# Patient Record
Sex: MALE | Race: BLACK OR AFRICAN AMERICAN | NOT HISPANIC OR LATINO | Employment: FULL TIME | URBAN - METROPOLITAN AREA
[De-identification: names, ages, dates, MRNs, and addresses within clinical notes are randomized per-mention and may not be internally consistent; named-entity substitution may affect disease eponyms.]

---

## 2017-03-21 ENCOUNTER — HOSPITAL ENCOUNTER (EMERGENCY)
Facility: HOSPITAL | Age: 42
Discharge: HOME | End: 2017-03-21
Attending: EMERGENCY MEDICINE

## 2017-03-21 VITALS
TEMPERATURE: 98 F | SYSTOLIC BLOOD PRESSURE: 129 MMHG | RESPIRATION RATE: 20 BRPM | HEIGHT: 66 IN | DIASTOLIC BLOOD PRESSURE: 71 MMHG | HEART RATE: 56 BPM | WEIGHT: 160 LBS | BODY MASS INDEX: 25.71 KG/M2 | OXYGEN SATURATION: 97 %

## 2017-03-21 DIAGNOSIS — M79.18 MUSCULOSKELETAL PAIN: Primary | ICD-10-CM

## 2017-03-21 LAB
BACTERIA #/AREA URNS HPF: NORMAL /HPF
BILIRUB UR QL STRIP: NEGATIVE
CLARITY UR: CLEAR
COLOR UR: YELLOW
GLUCOSE UR QL STRIP: NEGATIVE
HGB UR QL STRIP: NEGATIVE
KETONES UR QL STRIP: NEGATIVE
LEUKOCYTE ESTERASE UR QL STRIP: NEGATIVE
MICROSCOPIC COMMENT: NORMAL
NITRITE UR QL STRIP: NEGATIVE
PH UR STRIP: 5 [PH] (ref 5–8)
PROT UR QL STRIP: NEGATIVE
RBC #/AREA URNS HPF: 2 /HPF (ref 0–4)
SP GR UR STRIP: 1.02 (ref 1–1.03)
SQUAMOUS #/AREA URNS HPF: 2 /HPF
URN SPEC COLLECT METH UR: NORMAL
UROBILINOGEN UR STRIP-ACNC: NEGATIVE EU/DL
WBC #/AREA URNS HPF: 1 /HPF (ref 0–5)

## 2017-03-21 PROCEDURE — 81000 URINALYSIS NONAUTO W/SCOPE: CPT

## 2017-03-21 PROCEDURE — 99283 EMERGENCY DEPT VISIT LOW MDM: CPT

## 2017-03-21 RX ORDER — METHOCARBAMOL 750 MG/1
1500 TABLET, FILM COATED ORAL 3 TIMES DAILY
Qty: 30 TABLET | Refills: 0 | Status: SHIPPED | OUTPATIENT
Start: 2017-03-21 | End: 2017-03-26

## 2017-03-21 NOTE — ED PROVIDER NOTES
"Encounter Date: 3/21/2017    SCRIBE #1 NOTE: I, Shu Oconnell, am scribing for, and in the presence of,  ADRIANA Mckinney. I have scribed the following portions of the note - Other sections scribed: HPI, ROS.       History     Chief Complaint   Patient presents with    Back Pain     Pt. c/o frequent urination and back pain that began in the lower back and "now is higher up".      Review of patient's allergies indicates:  No Known Allergies  HPI Comments: CC: Back Pain    HPI: 41 year old male with no pertinent PMHx presents to the ED c/o intermittent, moderate (4/10) bilateral lower back pain for 3 weeks. Patient states the pain worsened 3 days ago, and is now radiating up to his left flank. Patient reports frequency 3 days ago which he attributed to drinking a lot at work. Pain is exacerbated with movement. Patient reports treating with Aleve, and seeing a chiropractor which provided temporary relief. Patient denies a hx of kidney infections. Patient otherwise denies fall, trauma, penile discharge, dysuria, testicular pain, nausea, vomiting, appetite loss and other symptoms.       The history is provided by the patient. No  was used.     Past Medical History:   Diagnosis Date    ACL (anterior cruciate ligament) rupture      Past Surgical History:   Procedure Laterality Date    ANTERIOR CRUCIATE LIGAMENT REPAIR       History reviewed. No pertinent family history.  Social History   Substance Use Topics    Smoking status: Never Smoker    Smokeless tobacco: None    Alcohol use Yes     Review of Systems   Constitutional: Negative for appetite change, chills and fever.   HENT: Negative for ear pain, rhinorrhea and sore throat.    Eyes: Negative for pain.   Respiratory: Negative for cough and shortness of breath.    Cardiovascular: Negative for chest pain.   Gastrointestinal: Negative for abdominal pain, diarrhea, nausea and vomiting.   Genitourinary: Positive for flank pain (left sided) " and frequency. Negative for decreased urine volume, discharge, dysuria, hematuria, testicular pain and urgency.   Musculoskeletal: Positive for back pain (bilateral lower).   Skin: Negative for rash.   Neurological: Negative for headaches.       Physical Exam   Initial Vitals   BP Pulse Resp Temp SpO2   03/21/17 1203 03/21/17 1203 03/21/17 1203 03/21/17 1203 03/21/17 1203   119/64 73 17 97.6 °F (36.4 °C) 97 %     Physical Exam    Nursing note and vitals reviewed.  Constitutional: He appears well-developed and well-nourished. He is not diaphoretic. No distress.   HENT:   Head: Normocephalic and atraumatic.   Eyes: EOM are normal. Pupils are equal, round, and reactive to light.   Neck: Normal range of motion. Neck supple.   Cardiovascular: Normal rate and regular rhythm.   Pulmonary/Chest: Breath sounds normal. No respiratory distress. He has no wheezes. He has no rhonchi. He has no rales. He exhibits no tenderness.   Abdominal: Soft. Bowel sounds are normal. He exhibits no distension. There is no tenderness. There is no rebound and no guarding.   Musculoskeletal: Normal range of motion. He exhibits no edema.        Back:    No saddle anesthesia.   Neurological: He is alert and oriented to person, place, and time.   Skin: Skin is warm. No erythema.         ED Course   Procedures  Labs Reviewed   URINALYSIS   URINALYSIS MICROSCOPIC             Medical Decision Making:   Differential Diagnosis:   This is an urgent evaluation of a 41-year-old male who presents to the emergency department complaining of back pain and urinary frequency.  He spoke with a friend who told him that he could have an infected kidney.  Therefore, that is his concern.    Previous medical records were obtained and reviewed.  This patient has no past medical history.    The patient is currently afebrile and nontoxic in appearance.  Vital signs are stable.  On physical exam, this patient is well appearing.  No distress is noted.  There is some mild  tenderness to palpation of the bilateral lumbar paraspinal region and thoracic paraspinal region.  There is no midline spinal tenderness noted.  There is no CVA tenderness noted.  There is no saddle anesthesia.  The remaining physical exam is unremarkable.  I carefully considered but doubt spinal fracture, acute disc herniation with deficit, cauda equina, pyelonephritis, ureterolithiasis, urinary tract infection.  I believe this patient has musculoskeletal strain.  I will treat with Robaxin and ibuprofen.  Signs and symptoms of worsening were thoroughly reviewed with him.  Ice and heat encouraged for pain.  He stable for discharge at this time.  This case was discussed with Dr. Corona and he is in agreement with the assessment and treatment plan.            Scribe Attestation:   Scribe #1: I performed the above scribed service and the documentation accurately describes the services I performed. I attest to the accuracy of the note.    Attending Attestation:     Physician Attestation Statement for NP/PA:   I discussed this assessment and plan of this patient with the NP/PA, but I did not personally examine the patient. The face to face encounter was performed by the NP/PA.    Other NP/PA Attestation Additions:      Medical Decision Making: I have reviewed the documentation of the mid-level provider and discussed case in detail.  I agree with the differential diagnosis documentation and treatment plan.       Physician Attestation for Scribe:  Physician Attestation Statement for Scribe #1: I, ADRIANA Mckinney, reviewed documentation, as scribed by Shu Oconnell in my presence, and it is both accurate and complete.                 ED Course     Clinical Impression:   The encounter diagnosis was Musculoskeletal pain.    Disposition:   Disposition: Discharged  Condition: Stable       Isamar Dhaliwal PA-C  03/21/17 1422       Isamar Dhaliwal PA-C  03/21/17 1422       Jermain Corona MD  03/21/17 [id number]

## 2017-03-21 NOTE — ED TRIAGE NOTES
Pt with c/o intermittent left side low back pain that radiates upward towards left lateral middle back and down to left buttock. Pt reports his pain  began 3 weeks ago and is not improving. Pt denies dysuria.

## 2017-03-21 NOTE — ED AVS SNAPSHOT
"          [facility]  [address] [place] [address]               Tyson Perrin   3/21/2017 12:18 PM   ED    Description:  Male : 1975   Department:  [facility]           Your Care was Coordinated By:     Provider Role From To    Jermain Corona MD Attending Provider [phone] --    Isamar Dhaliwal PA-C Physician Assistant [phone] --      Reason for Visit     Back Pain           Diagnoses this Visit        Comments    Musculoskeletal pain    -  Primary       ED Disposition     None           To Do List           Follow-up Information     Call Te Ureña MD.    Specialty:  Internal Medicine    Contact information:    [address]  [place] [address] [phone]        [facility] On Call     [facility] -  Assistance  Registered nurses in the [facility] provide clinical advisement, health education, appointment booking, and other advisory services.  Call for this free service at [phone].             Medications           Message regarding Medications     Verify the changes and/or additions to your medication regime listed below are the same as discussed with your clinician today.  If any of these changes or additions are incorrect, please notify your healthcare provider.             Verify that the below list of medications is an accurate representation of the medications you are currently taking.  If none reported, the list may be blank. If incorrect, please contact your healthcare provider. Carry this list with you in case of emergency.           Current Medications     NAPROXEN SODIUM (ALEVE ORAL) Take by mouth.           Clinical Reference Information           Your Vitals Were     BP Pulse Temp Resp Height Weight    119/64 (BP Location: Right arm, Patient Position: Sitting) 73 97.6 °F (36.4 °C) (Oral) 17 5' 6" (1.676 m) 72.6 kg (160 lb)    SpO2 BMI             97% 25.82 kg/m2       "   Allergies as of 3/21/2017     No Known Allergies      Immunizations Administered on Date of Encounter - 3/21/2017     None      ED Micro, Lab, POCT     Start Ordered       Status Ordering Provider    03/21/17 1253 03/21/17 1252  Urinalysis  STAT      Final result     03/21/17 1252 03/21/17 1252  Urinalysis Microscopic  Once      Final result       ED Imaging Orders     None        Discharge Instructions       Take medication as prescribed.  You may take additional Tylenol or Motrin with this medication.  Rest.  Apply ice and heat to the areas for pain relief.  Return to the ED for any changing or concerning symptoms.          Back Care Tips    Caring for your back  These are things you can do to prevent a recurrence of acute back pain and to reduce symptoms from chronic back pain:  · Maintain a healthy weight. If you are overweight, losing weight will help most types of back pain.  · Exercise is an important part of recovery from most types of back pain. The muscles behind and in front of the spine support the back. This means strengthening both the back muscles and the abdominal muscles will provide better support for your spine.   · Swimming and brisk walking are good overall exercises to improve your fitness level.  · Practice safe lifting methods (below).  · Practice good posture when sitting, standing and walking. Avoid prolonged sitting. This puts more stress on the lower back than standing or walking.  · Wear quality shoes with sufficient arch support. Foot and ankle alignment can affect back symptoms. Women should avoid wearing high heels.  · Therapeutic massage can help relax the back muscles without stretching them.  · During the first 24 to 72 hours after an acute injury or flare-up of chronic back pain, apply an ice pack to the painful area for 20 minutes and then remove it for 20 minutes, over a period of 60 to 90 minutes, or several times a day. As a safety precaution, do not use a heating pad at  bedtime. Sleeping on a heating pad can lead to skin burns or tissue damage.  · You can alternate ice and heat therapies.  Medications  Talk to your healthcare provider before using medicines, especially if you have other medical problems or are taking other medicines.  · You may use acetaminophen or ibuprofen to control pain, unless your healthcare provider prescribed other pain medicine. If you have chronic conditions like diabetes, liver or kidney disease, stomach ulcers, or gastrointestinal bleeding, or are taking blood thinners, talk with your healthcare provider before taking any medicines.  · Be careful if you are given prescription pain medicines, narcotics, or medicine for muscle spasm. They can cause drowsiness, affect your coordination, reflexes, and judgment. Do not drive or operate heavy machinery while taking these types of medicines. Take prescription pain medicine only as prescribed by your healthcare provider.  Lumbar stretch  Here is a simple stretching exercise that will help relax muscle spasm and keep your back more limber. If exercise makes your back pain worse, dont do it.  · Lie on your back with your knees bent and both feet on the ground.  · Slowly raise your left knee to your chest as you flatten your lower back against the floor. Hold for 5 seconds.  · Relax and repeat the exercise with your right knee.  · Do 10 of these exercises for each leg.  Safe lifting method  · Dont bend over at the waist to lift an object off the floor.  Instead, bend your knees and hips in a squat.   · Keep your back and head upright  · Hold the object close to your body, directly in front of you.  · Straighten your legs to lift the object.   · Lower the object to the floor in the reverse fashion.  · If you must slide something across the floor, push it.  Posture tips  Sitting  Sit in chairs with straight backs or low-back support. Keep your knees lower than your hips, with your feet flat on the floor.  When  driving, sit up straight. Adjust the seat forward so you are not leaning toward the steering wheel.  A small pillow or rolled towel behind your lower back may help if you are driving long distances.   Standing  When standing for long periods, shift most of your weight to one leg at a time. Alternate legs every few minutes.   Sleeping  The best way to sleep is on your side with your knees bent. Put a low pillow under your head to support your neck in a neutral spine position. Avoid thick pillows that bend your neck to one side. Put a pillow between your legs to further relax your lower back. If you sleep on your back, put pillows under your knees to support your legs in a slightly flexed position. Use a firm mattress. If your mattress sags, replace it, or use a 1/2-inch plywood board under the mattress to add support.  Follow-up care  Follow up with your healthcare provider, or as advised.  If X-rays, a CT scan or an MRI scan were taken, they will be reviewed by a radiologist. You will be notified of any new findings that may affect your care.  Call 911  Seek emergency medical care if any of the following occur:  · Trouble breathing  · Confusion  · Very drowsy  · Fainting or loss of consciousness  · Rapid or very slow heart rate  · Loss of  bowel or bladder control  When to seek medical care  Call your healthcare provider if any of the following occur:  · Pain becomes worse or spreads to your arms or legs  · Weakness or numbness in one or both arms or legs  · Numbness in the groin area  Date Last Reviewed: 6/1/2016 © 2000-2016 [employer], [employer]. [address] [place] [address]. All rights reserved. This information is not intended as a substitute for professional medical care. Always follow your healthcare professional's instructions.          General Neck and Back Pain    Both neck and back pain are usually caused by injury to the muscles or ligaments of the spine. Sometimes the disks that  separate each bone of the spine may cause pain by pressing on a nearby nerve. Back and neck pain may appear after a sudden twisting or bending force (such as in a car accident), or sometimes after a simple awkward movement. In either case, muscle spasm is often present and adds to the pain.  Acute neck and back pain usually gets better in 1 to 2 weeks. Pain related to disk disease, arthritis in the spinal joints or spinal stenosis (narrowing of the spinal canal) can become chronic and last for months or years.  Back and neck pain are common problems. Most people feel better in 1 or 2 weeks, and most of the rest in 1 to 2 months. Most people can remain active.  People experience and describe pain differently.  · Pain can be sharp, stabbing, shooting, aching, cramping, or burning  · Movement, standing, bending, lifting, sitting, or walking may worsen the pain  · Pain can be localized to one spot or area, or it can be more generalized  · Pain can spread or radiate upwards, downwards, to the front, or go down your arms  · Muscle spasm may occur.  Most of the time mechanical problems with the muscles or spine cause the pain. it is usually caused by an injury, whether known or not, to the muscles or ligaments. While illnesses can cause back pain, it is usually not caused by a serious illness. Pain is usually related to physical activity, whether sports, exercise, work, or normal activity. Sometimes it can occur without an identifiable cause. This can happen simply by stretching or moving wrong, without noting pain at the time. Other causes include:  · Overexertion, lifting, pushing, pulling incorrectly or too aggressively.  · Sudden twisting, bending or stretching from an accident (car or fall), or accidental movement.  · Poor posture  · Poor conditioning, lack of regular exercise  · Spinal disc disease or arthritis  · Stress  · Pregnancy, or illness like appendicitis, bladder or kidney infection, pelvic infections   Home  care  · For neck pain: Use a comfortable pillow that supports the head and keeps the spine in a neutral position. The position of the head should not be tilted forward or backward.  · When in bed, try to find a position of comfort. A firm mattress is best. Try lying flat on your back with pillows under your knees. You can also try lying on your side with your knees bent up towards your chest and a pillow between your knees.  · At first, do not try to stretch out the sore spots. If there is a strain, it is not like the good soreness you get after exercising without an injury. In this case, stretching may make it worse.  · Avoid prolonged sitting, long car rides or travel. This puts more stress on the lower back than standing or walking.  · During the first 24 to 72 hours after an injury, apply an ice pack to the painful area for 20 minutes and then remove it for 20 minutes over a period of 60 to 90 minutes or several times a day.   · You can alternate ice and heat therapies. Talk with your healthcare provider about the best treatment for your back or neck pain. As a safety precaution, do not use a heating pad at bedtime. Sleeping with a heating pad can lead to skin burns or tissue damage.  · Therapeutic massage can help relax the back and neck muscles without stretching them.  · Be aware of safe lifting methods and do not lift anything over 15 pounds until all the pain is gone.  Medications  Talk to your healthcare provider before using medicine, especially if you have other medical problems or are taking other medicines.  · You may use over-the-counter medicine to control pain, unless another pain medicine was prescribed. If you have chronic conditions like diabetes, liver or kidney disease, stomach ulcers,  gastrointestinal bleeding, or are taking blood thinner medicines.  · Be careful if you are given pain medicines, narcotics, or medicine for muscle spasm. They can cause drowsiness, and can affect your  coordination, reflexes, and judgment. Do not drive or operate heavy machinery.  Follow-up care  Follow up with your healthcare provider, or as advised. Physical therapy or further tests may be needed.  If X-rays were taken, you will be notified of any new findings that may affect your care.  Call 911  Seek emergency medical care if any of the following occur:  · Trouble breathing  · Confusion  · Very drowsy or trouble awakening  · Fainting or loss of consciousness  · Rapid or very slow heart rate  · Loss of bowel or bladder control  When to seek medical advice  Call your healthcare provider right away if any of these occur:  · Pain becomes worse or spreads into your arms or legs  · Weakness, numbness or pain in one or both arms or legs  · Numbness in the groin area  · Difficulty walking  · Fever of 100.4ºF (38ºC) or higher, or as directed by your healthcare provider  Date Last Reviewed: 7/1/2016  © 7730-3628 [employer]. [address] [place] [address]. All rights reserved. This information is not intended as a substitute for professional medical care. Always follow your healthcare professional's instructions.          Myalgias  Myalgias are another word for muscle aches and soreness. This is a symptom, not a disease. Myalgias can have many causes. A cold, the flu, or an acute infection can cause them. So can any illness with a high fever. They may happen after exertion (such as heavy exercise) or trauma (such as an accident or fall). Some medicines (such as statins and certain antidepressants) can cause myalgias. They can also be a symptom of chronic or ongoing medical problems (such as lupus, chronic fatigue, or hypothyroidism). With these illnesses, other serious symptoms often occur in addition to muscle pain and soreness.    Myalgias most often go away on their own. If they don't go away, come back, or are severe, testing may be needed to help find the cause.  Home care  · Rest until you  feel better.  · Follow instructions that you were given for how to care for yourself. This may depend on the cause of your myalgias.   · If myalgia is thought to be due to a medicine, be sure to talk to the doctor that prescribed the medicine about the best course of action.  · To control pain, take prescription or over-the-counter medicines as directed. Unless told not to, you can try acetaminophen or ibuprofen.  Follow-up care  Follow up with your healthcare provider or as advised by our staff. If your symptoms do not go away in a few days or if they come back, follow up with your healthcare provider for an exam and testing.  When to see medical advice  Call your healthcare provider for any of the following:  · Fever of 100.4°F (38ºC) or higher, or as directed by your healthcare provider  · Pain that gets worse and not better, or that goes away and comes back  · New joint pains  · New rash  · Severe headache, neck pain, drowsiness, or confusion  Date Last Reviewed: 5/14/2015 © 2000-2016 [employer]. [address] [place] [address]. All rights reserved. This information is not intended as a substitute for professional medical care. Always follow your healthcare professional's instructions.          Back Basics: A Healthy Spine  A healthy spine supports the body while letting it move freely. It does this with the help of three natural curves. Strong, flexible muscles help, too. They support the spine by keeping its curves properly aligned. The disks that cushion the bones of your spine also play a role in back fitness.    Three natural curves  The spine is made of bones (vertebrae) and pads of soft tissue (disks). These parts are arranged in three curves: cervical, thoracic, and lumbar. When properly aligned, these curves keep your body balanced. They also support your body when you move. By distributing your weight throughout your spine, the curves make back injuries less likely.  Strong,  flexible muscles  Strong, flexible back muscles help support the three curves of the spine. They do so by holding the vertebrae and disks in proper alignment. Strong, flexible abdominal, hip, and leg muscles also reduce strain on the back.  The lumbar curve  The lumbar curve is the hardest-working part of the spine. It carries more weight and moves the most. Aligning this curve helps prevent damage to vertebrae, disks, and other parts of the spine.  Cushioning disks  Disks are the soft pads of tissue between the vertebrae. The disks absorb shock caused by movement. Each disk has a spongy center (nucleus) and a tougher outer ring (annulus). Movement within the nucleus allows the vertebrae to rock back and forth on the disks. This provides the flexibility needed to bend and move.       Date Last Reviewed: 10/18/2015  © 5685-0765 [employer]. [address] [place] [address]. All rights reserved. This information is not intended as a substitute for professional medical care. Always follow your healthcare professional's instructions.          [facility] Sign-Up     Activating your [facility] account is as easy as 1-2-3!     1) Visit [email or web address], select Sign Up Now, enter this activation code and your date of birth, then select Next.  [email or web address]  Expires: 5/5/2017  1:51 PM      2) Create a username and password to use when you visit [facility] in the future and select a security question in case you lose your password and select Next.    3) Enter your e-mail address and click Sign Up!    Additional Information  If you have questions, please e-mail [email or web address] or call [phone] to talk to our [facility] staff. Remember, [facility] is NOT to be used for urgent needs. For medical emergencies, dial 911.          [facility] complies with applicable Federal civil rights laws and does not discriminate on the basis of race, color, national origin, age, disability, or  sex.        Language Assistance Services     ATTENTION: Language assistance services are available, free of charge. Please call [phone].      ATENCIÓN: Si habla español, tiene a connelly disposición servicios gratuitos de asistencia lingüística. Llame al [phone].     CHÚ Ý: N?u b?n nói Ti?ng Vi?t, có các d?ch v? h? tr? ngôn ng? mi?n phí dành cho b?n. G?i s? [phone].

## 2022-05-17 NOTE — DISCHARGE INSTRUCTIONS
Take medication as prescribed.  You may take additional Tylenol or Motrin with this medication.  Rest.  Apply ice and heat to the areas for pain relief.  Return to the ED for any changing or concerning symptoms.          Back Care Tips    Caring for your back  These are things you can do to prevent a recurrence of acute back pain and to reduce symptoms from chronic back pain:  · Maintain a healthy weight. If you are overweight, losing weight will help most types of back pain.  · Exercise is an important part of recovery from most types of back pain. The muscles behind and in front of the spine support the back. This means strengthening both the back muscles and the abdominal muscles will provide better support for your spine.   · Swimming and brisk walking are good overall exercises to improve your fitness level.  · Practice safe lifting methods (below).  · Practice good posture when sitting, standing and walking. Avoid prolonged sitting. This puts more stress on the lower back than standing or walking.  · Wear quality shoes with sufficient arch support. Foot and ankle alignment can affect back symptoms. Women should avoid wearing high heels.  · Therapeutic massage can help relax the back muscles without stretching them.  · During the first 24 to 72 hours after an acute injury or flare-up of chronic back pain, apply an ice pack to the painful area for 20 minutes and then remove it for 20 minutes, over a period of 60 to 90 minutes, or several times a day. As a safety precaution, do not use a heating pad at bedtime. Sleeping on a heating pad can lead to skin burns or tissue damage.  · You can alternate ice and heat therapies.  Medications  Talk to your healthcare provider before using medicines, especially if you have other medical problems or are taking other medicines.  · You may use acetaminophen or ibuprofen to control pain, unless your healthcare provider prescribed other pain medicine. If you have chronic  conditions like diabetes, liver or kidney disease, stomach ulcers, or gastrointestinal bleeding, or are taking blood thinners, talk with your healthcare provider before taking any medicines.  · Be careful if you are given prescription pain medicines, narcotics, or medicine for muscle spasm. They can cause drowsiness, affect your coordination, reflexes, and judgment. Do not drive or operate heavy machinery while taking these types of medicines. Take prescription pain medicine only as prescribed by your healthcare provider.  Lumbar stretch  Here is a simple stretching exercise that will help relax muscle spasm and keep your back more limber. If exercise makes your back pain worse, dont do it.  · Lie on your back with your knees bent and both feet on the ground.  · Slowly raise your left knee to your chest as you flatten your lower back against the floor. Hold for 5 seconds.  · Relax and repeat the exercise with your right knee.  · Do 10 of these exercises for each leg.  Safe lifting method  · Dont bend over at the waist to lift an object off the floor.  Instead, bend your knees and hips in a squat.   · Keep your back and head upright  · Hold the object close to your body, directly in front of you.  · Straighten your legs to lift the object.   · Lower the object to the floor in the reverse fashion.  · If you must slide something across the floor, push it.  Posture tips  Sitting  Sit in chairs with straight backs or low-back support. Keep your knees lower than your hips, with your feet flat on the floor.  When driving, sit up straight. Adjust the seat forward so you are not leaning toward the steering wheel.  A small pillow or rolled towel behind your lower back may help if you are driving long distances.   Standing  When standing for long periods, shift most of your weight to one leg at a time. Alternate legs every few minutes.   Sleeping  The best way to sleep is on your side with your knees bent. Put a low pillow  under your head to support your neck in a neutral spine position. Avoid thick pillows that bend your neck to one side. Put a pillow between your legs to further relax your lower back. If you sleep on your back, put pillows under your knees to support your legs in a slightly flexed position. Use a firm mattress. If your mattress sags, replace it, or use a 1/2-inch plywood board under the mattress to add support.  Follow-up care  Follow up with your healthcare provider, or as advised.  If X-rays, a CT scan or an MRI scan were taken, they will be reviewed by a radiologist. You will be notified of any new findings that may affect your care.  Call 911  Seek emergency medical care if any of the following occur:  · Trouble breathing  · Confusion  · Very drowsy  · Fainting or loss of consciousness  · Rapid or very slow heart rate  · Loss of  bowel or bladder control  When to seek medical care  Call your healthcare provider if any of the following occur:  · Pain becomes worse or spreads to your arms or legs  · Weakness or numbness in one or both arms or legs  · Numbness in the groin area  Date Last Reviewed: 6/1/2016  © 8168-0781 [employer]. [address] [place] [address]. All rights reserved. This information is not intended as a substitute for professional medical care. Always follow your healthcare professional's instructions.          General Neck and Back Pain    Both neck and back pain are usually caused by injury to the muscles or ligaments of the spine. Sometimes the disks that separate each bone of the spine may cause pain by pressing on a nearby nerve. Back and neck pain may appear after a sudden twisting or bending force (such as in a car accident), or sometimes after a simple awkward movement. In either case, muscle spasm is often present and adds to the pain.  Acute neck and back pain usually gets better in 1 to 2 weeks. Pain related to disk disease, arthritis in the spinal joints or  spinal stenosis (narrowing of the spinal canal) can become chronic and last for months or years.  Back and neck pain are common problems. Most people feel better in 1 or 2 weeks, and most of the rest in 1 to 2 months. Most people can remain active.  People experience and describe pain differently.  · Pain can be sharp, stabbing, shooting, aching, cramping, or burning  · Movement, standing, bending, lifting, sitting, or walking may worsen the pain  · Pain can be localized to one spot or area, or it can be more generalized  · Pain can spread or radiate upwards, downwards, to the front, or go down your arms  · Muscle spasm may occur.  Most of the time mechanical problems with the muscles or spine cause the pain. it is usually caused by an injury, whether known or not, to the muscles or ligaments. While illnesses can cause back pain, it is usually not caused by a serious illness. Pain is usually related to physical activity, whether sports, exercise, work, or normal activity. Sometimes it can occur without an identifiable cause. This can happen simply by stretching or moving wrong, without noting pain at the time. Other causes include:  · Overexertion, lifting, pushing, pulling incorrectly or too aggressively.  · Sudden twisting, bending or stretching from an accident (car or fall), or accidental movement.  · Poor posture  · Poor conditioning, lack of regular exercise  · Spinal disc disease or arthritis  · Stress  · Pregnancy, or illness like appendicitis, bladder or kidney infection, pelvic infections   Home care  · For neck pain: Use a comfortable pillow that supports the head and keeps the spine in a neutral position. The position of the head should not be tilted forward or backward.  · When in bed, try to find a position of comfort. A firm mattress is best. Try lying flat on your back with pillows under your knees. You can also try lying on your side with your knees bent up towards your chest and a pillow between  your knees.  · At first, do not try to stretch out the sore spots. If there is a strain, it is not like the good soreness you get after exercising without an injury. In this case, stretching may make it worse.  · Avoid prolonged sitting, long car rides or travel. This puts more stress on the lower back than standing or walking.  · During the first 24 to 72 hours after an injury, apply an ice pack to the painful area for 20 minutes and then remove it for 20 minutes over a period of 60 to 90 minutes or several times a day.   · You can alternate ice and heat therapies. Talk with your healthcare provider about the best treatment for your back or neck pain. As a safety precaution, do not use a heating pad at bedtime. Sleeping with a heating pad can lead to skin burns or tissue damage.  · Therapeutic massage can help relax the back and neck muscles without stretching them.  · Be aware of safe lifting methods and do not lift anything over 15 pounds until all the pain is gone.  Medications  Talk to your healthcare provider before using medicine, especially if you have other medical problems or are taking other medicines.  · You may use over-the-counter medicine to control pain, unless another pain medicine was prescribed. If you have chronic conditions like diabetes, liver or kidney disease, stomach ulcers,  gastrointestinal bleeding, or are taking blood thinner medicines.  · Be careful if you are given pain medicines, narcotics, or medicine for muscle spasm. They can cause drowsiness, and can affect your coordination, reflexes, and judgment. Do not drive or operate heavy machinery.  Follow-up care  Follow up with your healthcare provider, or as advised. Physical therapy or further tests may be needed.  If X-rays were taken, you will be notified of any new findings that may affect your care.  Call 911  Seek emergency medical care if any of the following occur:  · Trouble breathing  · Confusion  · Very drowsy or trouble  awakening  · Fainting or loss of consciousness  · Rapid or very slow heart rate  · Loss of bowel or bladder control  When to seek medical advice  Call your healthcare provider right away if any of these occur:  · Pain becomes worse or spreads into your arms or legs  · Weakness, numbness or pain in one or both arms or legs  · Numbness in the groin area  · Difficulty walking  · Fever of 100.4ºF (38ºC) or higher, or as directed by your healthcare provider  Date Last Reviewed: 7/1/2016 © 2000-2016 [email or web address]. [address] [place] [id number]. All rights reserved. This information is not intended as a substitute for professional medical care. Always follow your healthcare professional's instructions.          Myalgias  Myalgias are another word for muscle aches and soreness. This is a symptom, not a disease. Myalgias can have many causes. A cold, the flu, or an acute infection can cause them. So can any illness with a high fever. They may happen after exertion (such as heavy exercise) or trauma (such as an accident or fall). Some medicines (such as statins and certain antidepressants) can cause myalgias. They can also be a symptom of chronic or ongoing medical problems (such as lupus, chronic fatigue, or hypothyroidism). With these illnesses, other serious symptoms often occur in addition to muscle pain and soreness.    Myalgias most often go away on their own. If they don't go away, come back, or are severe, testing may be needed to help find the cause.  Home care  · Rest until you feel better.  · Follow instructions that you were given for how to care for yourself. This may depend on the cause of your myalgias.   · If myalgia is thought to be due to a medicine, be sure to talk to the doctor that prescribed the medicine about the best course of action.  · To control pain, take prescription or over-the-counter medicines as directed. Unless told not to, you can try acetaminophen or  ibuprofen.  Follow-up care  Follow up with your healthcare provider or as advised by our staff. If your symptoms do not go away in a few days or if they come back, follow up with your healthcare provider for an exam and testing.  When to see medical advice  Call your healthcare provider for any of the following:  · Fever of 100.4°F (38ºC) or higher, or as directed by your healthcare provider  · Pain that gets worse and not better, or that goes away and comes back  · New joint pains  · New rash  · Severe headache, neck pain, drowsiness, or confusion  Date Last Reviewed: 5/14/2015 © 2000-2016 [employer]. [address] [place] [address], [place] [address]. All rights reserved. This information is not intended as a substitute for professional medical care. Always follow your healthcare professional's instructions.          Back Basics: A Healthy Spine  A healthy spine supports the body while letting it move freely. It does this with the help of three natural curves. Strong, flexible muscles help, too. They support the spine by keeping its curves properly aligned. The disks that cushion the bones of your spine also play a role in back fitness.    Three natural curves  The spine is made of bones (vertebrae) and pads of soft tissue (disks). These parts are arranged in three curves: cervical, thoracic, and lumbar. When properly aligned, these curves keep your body balanced. They also support your body when you move. By distributing your weight throughout your spine, the curves make back injuries less likely.  Strong, flexible muscles  Strong, flexible back muscles help support the three curves of the spine. They do so by holding the vertebrae and disks in proper alignment. Strong, flexible abdominal, hip, and leg muscles also reduce strain on the back.  The lumbar curve  The lumbar curve is the hardest-working part of the spine. It carries more weight and moves the most. Aligning this curve helps prevent damage to  vertebrae, disks, and other parts of the spine.  Cushioning disks  Disks are the soft pads of tissue between the vertebrae. The disks absorb shock caused by movement. Each disk has a spongy center (nucleus) and a tougher outer ring (annulus). Movement within the nucleus allows the vertebrae to rock back and forth on the disks. This provides the flexibility needed to bend and move.       Date Last Reviewed: 10/18/2015  © 8046-8723 [employer]. [address] [place] [address], [place] [address]. All rights reserved. This information is not intended as a substitute for professional medical care. Always follow your healthcare professional's instructions.         initiation of breastfeeding/breast milk feeding

## 2023-06-09 ENCOUNTER — TELEPHONE (OUTPATIENT)
Dept: ORTHOPEDICS | Facility: CLINIC | Age: 48
End: 2023-06-09
Payer: COMMERCIAL

## 2023-06-09 NOTE — TELEPHONE ENCOUNTER
----- Message from Jenni Lucio sent at 6/9/2023  1:24 PM CDT -----  Pt would like to be called back regarding  broke right hand and pinkey finger    Pt can be reached at  [phone]   We spoke  he needed a follow up from an ER visit for a fx'd 5th MT- one month ago-_   I booked an appt with Dr Quintanilla in late June but I'll check daily for sooner with any hand specialist

## 2023-06-12 ENCOUNTER — TELEPHONE (OUTPATIENT)
Dept: ORTHOPEDICS | Facility: CLINIC | Age: 48
End: 2023-06-12
Payer: COMMERCIAL

## 2023-06-12 DIAGNOSIS — T14.8XXA FX: Primary | ICD-10-CM

## 2023-06-13 ENCOUNTER — HOSPITAL ENCOUNTER (OUTPATIENT)
Dept: RADIOLOGY | Facility: OTHER | Age: 48
Discharge: HOME | End: 2023-06-13
Attending: PHYSICIAN ASSISTANT
Payer: COMMERCIAL

## 2023-06-13 ENCOUNTER — OFFICE VISIT (OUTPATIENT)
Dept: ORTHOPEDICS | Facility: CLINIC | Age: 48
End: 2023-06-13
Payer: COMMERCIAL

## 2023-06-13 VITALS — BODY MASS INDEX: 25.71 KG/M2 | HEIGHT: 66 IN | WEIGHT: 160 LBS

## 2023-06-13 DIAGNOSIS — T14.8XXA FX: ICD-10-CM

## 2023-06-13 DIAGNOSIS — S62.336A CLOSED DISPLACED FRACTURE OF NECK OF FIFTH METACARPAL BONE OF RIGHT HAND, INITIAL ENCOUNTER: Primary | ICD-10-CM

## 2023-06-13 PROCEDURE — 1159F MED LIST DOCD IN RCRD: CPT | Mod: CPTII,S$GLB,, | Performed by: PHYSICIAN ASSISTANT

## 2023-06-13 PROCEDURE — 73130 X-RAY EXAM OF HAND: CPT | Mod: 26,RT,, | Performed by: RADIOLOGY

## 2023-06-13 PROCEDURE — 99999 PR PBB SHADOW E&M-EST. PATIENT-LVL III: CPT | Mod: PBBFAC,,, | Performed by: PHYSICIAN ASSISTANT

## 2023-06-13 PROCEDURE — 99205 PR OFFICE/OUTPT VISIT, NEW, LEVL V, 60-74 MIN: ICD-10-PCS | Mod: 25,S$GLB,, | Performed by: PHYSICIAN ASSISTANT

## 2023-06-13 PROCEDURE — 29125 APPL SHORT ARM SPLINT STATIC: CPT | Mod: RT,S$GLB,, | Performed by: PHYSICIAN ASSISTANT

## 2023-06-13 PROCEDURE — 29125 PR APPLY FOREARM SPLINT,STATIC: ICD-10-PCS | Mod: RT,S$GLB,, | Performed by: PHYSICIAN ASSISTANT

## 2023-06-13 PROCEDURE — 1159F PR MEDICATION LIST DOCUMENTED IN MEDICAL RECORD: ICD-10-PCS | Mod: CPTII,S$GLB,, | Performed by: PHYSICIAN ASSISTANT

## 2023-06-13 PROCEDURE — 99999 PR PBB SHADOW E&M-EST. PATIENT-LVL III: ICD-10-PCS | Mod: PBBFAC,,, | Performed by: PHYSICIAN ASSISTANT

## 2023-06-13 PROCEDURE — 73130 X-RAY EXAM OF HAND: CPT | Mod: TC,FY,RT

## 2023-06-13 PROCEDURE — 3008F BODY MASS INDEX DOCD: CPT | Mod: CPTII,S$GLB,, | Performed by: PHYSICIAN ASSISTANT

## 2023-06-13 PROCEDURE — 3008F PR BODY MASS INDEX (BMI) DOCUMENTED: ICD-10-PCS | Mod: CPTII,S$GLB,, | Performed by: PHYSICIAN ASSISTANT

## 2023-06-13 PROCEDURE — 99205 OFFICE O/P NEW HI 60 MIN: CPT | Mod: 25,S$GLB,, | Performed by: PHYSICIAN ASSISTANT

## 2023-06-13 PROCEDURE — 73130 XR HAND COMPLETE 3 VIEW RIGHT: ICD-10-PCS | Mod: 26,RT,, | Performed by: RADIOLOGY

## 2023-06-13 NOTE — H&P (VIEW-ONLY)
"Subjective:      Patient ID: Tyson Perrin is a 47 y.o. male.    Chief Complaint: Injury, Swelling, and Pain of the Right Hand      HPI  Tyson Perrin is a right hand dominant 47 y.o. male presenting today for evaluation of the right hand. Injury sustained about one month ago he reports the hand got caught in a metal door. He was seen at an external ED and dx with R 5th metacarpal fracture. Denies skin laceration. He was placed in a splint to include digits 2-5 which he has been using full time. Pain is minimal, not taking pain medication. He reports he had difficulty scheduling an appt it appears he did not contact our orthopedic dept until 6/9. 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      Review of patient's allergies indicates:  No Known Allergies      Current Outpatient Medications   Medication Sig Dispense Refill    NAPROXEN SODIUM (ALEVE ORAL) Take by mouth.       No current facility-administered medications for this visit.       Past Medical History:   Diagnosis Date    ACL (anterior cruciate ligament) rupture        Past Surgical History:   Procedure Laterality Date    ANTERIOR CRUCIATE LIGAMENT REPAIR         Review of Systems:  Constitutional: Negative for chills and fever.   Respiratory: Negative for cough and shortness of breath.    Gastrointestinal: Negative for nausea and vomiting.   Skin: Negative for rash.   Neurological: Negative for dizziness and headaches.   Psychiatric/Behavioral: Negative for depression.   MSK as in HPI       OBJECTIVE:     PHYSICAL EXAM:  Ht 5' 6" (1.676 m)   Wt 72.6 kg (160 lb)   BMI 25.82 kg/m²     GEN:  NAD, well-developed, well-groomed.  NEURO: Awake, alert, and oriented. Normal attention and concentration.    PSYCH: Normal mood and affect. Behavior is normal.  HEENT: No cervical lymphadenopathy noted.  CARDIOVASCULAR: Radial pulses 2+ bilaterally. No LE edema " noted.  PULMONARY: Breath sounds normal. No respiratory distress.  SKIN: Intact, no rashes.      MSK:   RUE:  Splint removed. There are some areas of maceration on the palm. Decreased flexion of digits 2-5 due to stiffness. Difficult to appreciate any rotational deformity of the small finger due to limitation of flexion. He he ttp at the known fracture site of the 5th metacarpal. No open wounds or skin abrasions present. AIN/PIN/Radial/Median/Ulnar Nerves assessed in isolation without deficit. Radial & Ulnar arteries palpated 2+. Capillary Refill <3s.    RADIOGRAPHS:  Xray right hand 6/13/23   Xray with displaced fracture of the 5th metacarpal neck with some callus formation     ASSESSMENT/PLAN:       ICD-10-CM ICD-9-CM   1. Closed displaced fracture of neck of fifth metacarpal bone of right hand, initial encounter  S62.336A 815.04       Orders Placed This Encounter    Ambulatory referral/consult to Physical/Occupational Therapy      Plan:   Treatment options discussed. Pt wishes to proceed with surgery for ORIF R 5th metacarpal. Consents reviewed and signed in clinic all questions answered. Discussed callus formation developing. He will need post operative therapy.   Pt placed in R ulnar gutter plaster splint today   Post op therapy orders placed  RTC PO         The patient indicates understanding of these issues and agrees to the plan.    Diane Dexter PA-C  Hand Clinic   [facility] [place]

## 2023-06-13 NOTE — PROGRESS NOTES
"Subjective:      Patient ID: Tyson Perrin is a 47 y.o. male.    Chief Complaint: Injury, Swelling, and Pain of the Right Hand      HPI  Tyson Perrin is a right hand dominant 47 y.o. male presenting today for evaluation of the right hand. Injury sustained about one month ago he reports the hand got caught in a metal door. He was seen at an external ED and dx with R 5th metacarpal fracture. Denies skin laceration. He was placed in a splint to include digits 2-5 which he has been using full time. Pain is minimal, not taking pain medication. He reports he had difficulty scheduling an appt it appears he did not contact our orthopedic dept until 6/9. 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      Review of patient's allergies indicates:  No Known Allergies      Current Outpatient Medications   Medication Sig Dispense Refill    NAPROXEN SODIUM (ALEVE ORAL) Take by mouth.       No current facility-administered medications for this visit.       Past Medical History:   Diagnosis Date    ACL (anterior cruciate ligament) rupture        Past Surgical History:   Procedure Laterality Date    ANTERIOR CRUCIATE LIGAMENT REPAIR         Review of Systems:  Constitutional: Negative for chills and fever.   Respiratory: Negative for cough and shortness of breath.    Gastrointestinal: Negative for nausea and vomiting.   Skin: Negative for rash.   Neurological: Negative for dizziness and headaches.   Psychiatric/Behavioral: Negative for depression.   MSK as in HPI       OBJECTIVE:     PHYSICAL EXAM:  Ht 5' 6" (1.676 m)   Wt 72.6 kg (160 lb)   BMI 25.82 kg/m²     GEN:  NAD, well-developed, well-groomed.  NEURO: Awake, alert, and oriented. Normal attention and concentration.    PSYCH: Normal mood and affect. Behavior is normal.  HEENT: No cervical lymphadenopathy noted.  CARDIOVASCULAR: Radial pulses 2+ bilaterally. No LE edema " noted.  PULMONARY: Breath sounds normal. No respiratory distress.  SKIN: Intact, no rashes.      MSK:   RUE:  Splint removed. There are some areas of maceration on the palm. Decreased flexion of digits 2-5 due to stiffness. Difficult to appreciate any rotational deformity of the small finger due to limitation of flexion. He he ttp at the known fracture site of the 5th metacarpal. No open wounds or skin abrasions present. AIN/PIN/Radial/Median/Ulnar Nerves assessed in isolation without deficit. Radial & Ulnar arteries palpated 2+. Capillary Refill <3s.    RADIOGRAPHS:  Xray right hand 6/13/23   Xray with displaced fracture of the 5th metacarpal neck with some callus formation     ASSESSMENT/PLAN:       ICD-10-CM ICD-9-CM   1. Closed displaced fracture of neck of fifth metacarpal bone of right hand, initial encounter  S62.336A 815.04       Orders Placed This Encounter    Ambulatory referral/consult to Physical/Occupational Therapy      Plan:   Treatment options discussed. Pt wishes to proceed with surgery for ORIF R 5th metacarpal. Consents reviewed and signed in clinic all questions answered. Discussed callus formation developing. He will need post operative therapy.   Pt placed in R ulnar gutter plaster splint today   Post op therapy orders placed  RTC PO         The patient indicates understanding of these issues and agrees to the plan.    Diane Dexter PA-C  Hand Clinic   [facility] [place]

## 2023-06-19 ENCOUNTER — ANESTHESIA EVENT (OUTPATIENT)
Dept: SURGERY | Facility: OTHER | Age: 48
End: 2023-06-19
Payer: COMMERCIAL

## 2023-06-19 ENCOUNTER — HOSPITAL ENCOUNTER (OUTPATIENT)
Dept: PREADMISSION TESTING | Facility: OTHER | Age: 48
Discharge: HOME | End: 2023-06-19
Attending: ORTHOPAEDIC SURGERY
Payer: COMMERCIAL

## 2023-06-19 VITALS
WEIGHT: 154 LBS | SYSTOLIC BLOOD PRESSURE: 158 MMHG | RESPIRATION RATE: 16 BRPM | HEART RATE: 66 BPM | HEIGHT: 66 IN | DIASTOLIC BLOOD PRESSURE: 95 MMHG | OXYGEN SATURATION: 99 % | TEMPERATURE: 98 F | BODY MASS INDEX: 24.75 KG/M2

## 2023-06-19 RX ORDER — LIDOCAINE HYDROCHLORIDE 10 MG/ML
0.5 INJECTION, SOLUTION EPIDURAL; INFILTRATION; INTRACAUDAL; PERINEURAL ONCE
Status: CANCELLED | OUTPATIENT
Start: 2023-06-19 | End: 2023-06-19

## 2023-06-19 RX ORDER — SODIUM CHLORIDE, SODIUM LACTATE, POTASSIUM CHLORIDE, CALCIUM CHLORIDE 600; 310; 30; 20 MG/100ML; MG/100ML; MG/100ML; MG/100ML
INJECTION, SOLUTION INTRAVENOUS CONTINUOUS
Status: CANCELLED | OUTPATIENT
Start: 2023-06-19

## 2023-06-19 RX ORDER — ACETAMINOPHEN 500 MG
1000 TABLET ORAL
Status: CANCELLED | OUTPATIENT
Start: 2023-06-19 | End: 2023-06-19

## 2023-06-19 RX ORDER — PREGABALIN 75 MG/1
75 CAPSULE ORAL ONCE
Status: CANCELLED | OUTPATIENT
Start: 2023-06-19 | End: 2023-06-19

## 2023-06-19 NOTE — DISCHARGE INSTRUCTIONS
Information to Prepare you for your Surgery    PRE-ADMIT TESTING -  [phone]    [address] [facility]          Your surgery has been scheduled at [facility]. We are pleased to have the opportunity to serve you. For Further Information please call [phone].    On the day of surgery please report to the Information Desk on the 1st floor.    CONTACT YOUR PHYSICIAN'S OFFICE THE DAY PRIOR TO YOUR SURGERY TO OBTAIN YOUR ARRIVAL TIME.     The evening before surgery do not eat anything after 9 p.m. ( this includes hard candy, chewing gum and mints).  You may only have GATORADE, POWERADE AND WATER  from 9 p.m. until you leave your home.   DO NOT DRINK ANY LIQUIDS ON THE WAY TO THE HOSPITAL.      Why does your anesthesiologist allow you to drink Gatorade/Powerade before surgery?  Gatorade/Powerade helps to increase your comfort before surgery and to decrease your nausea after surgery. The carbohydrates in Gatorade/Powerade help reduce your body's stress response to surgery.  If you are a diabetic-drink only water prior to surgery.       Patients may have 2 visitors pre and post procedure. Only 2 visitors will be allowed in the Surgical building with the patient. No one under the age of 12 will be allowed into the facility.    SPECIAL MEDICATION INSTRUCTIONS: TAKE medications checked off by the Anesthesiologist on your Medication List.    Angiogram Patients: Take medications as instructed by your physician, including aspirin.     Surgery Patients:    If you take ASPIRIN - Your PHYSICIAN/SURGEON will need to inform you IF/OR when you need to stop taking aspirin prior to your surgery.     The week prior to surgery do not ot take any medications containing IBUPROFEN or NSAIDS ( Advil, Motrin, Goodys, BC, Aleve, Naproxen etc) If you are not sure if you should take a medicine please call your surgeon's office.  Ok to take Tylenol    Do Not Wear any make-up  (especially eye make-up) to surgery. Please remove any false eyelashes or eyelash extensions. If you arrive the day of surgery with makeup/eyelashes on you will be required to remove prior to surgery. (There is a risk of corneal abrasions if eye makeup/eyelash extensions are not removed)      Leave all valuables at home.   Do Not wear any jewelry or watches, including any metal in body piercings. Jewelry must be removed prior to coming to the hospital.  There is a possibility that rings that are unable to be removed may be cut off if they are on the surgical extremity.    Please remove all hair extensions, wigs, clips and any other metal accessories/ ornaments from your hair.  These items may pose a flammable/fire risk in Surgery and must be removed.    Do not shave your surgical area at least 5 days prior to your surgery. The surgical prep will be performed at the hospital according to Infection Control regulations.    Contact Lens must be removed before surgery. Either do not wear the contact lens or bring a case and solution for storage.  Please bring a container for eyeglasses or dentures as required.  Bring any paperwork your physician has provided, such as consent forms,  history and physicals, doctor's orders, etc.   Bring comfortable clothes that are loose fitting to wear upon discharge. Take into consideration the type of surgery being performed.  Maintain your diet as advised per your physician the day prior to surgery.      Adequate rest the night before surgery is advised.   Park in the Parking lot behind the hospital or in the [facility] across the street from the parking lot. Parking is complimentary.  If you will be discharged the same day as your procedure, please arrange for a responsible adult to drive you home or to accompany you if traveling by taxi.   YOU WILL NOT BE PERMITTED TO DRIVE OR TO LEAVE THE HOSPITAL ALONE AFTER SURGERY.   If you are being discharged the same day, it is  strongly recommended that you arrange for someone to remain with you for the first 24 hrs following your surgery.    The Surgeon will speak to your family/visitor after your surgery regarding the outcome of your surgery and post op care.  The Surgeon may speak to you after your surgery, but there is a possibility you may not remember the details.  Please check with your family members regarding the conversation with the Surgeon.    We strongly recommend whoever is bringing you home be present for discharge instructions.  This will ensure a thorough understanding for your post op home care.    ALL CHILDREN MUST ALWAYS BE ACCOMPANIED BY AN ADULT.    Visitors-Refer to current Visitor policy handouts.    Thank you for your cooperation.  The Staff of [facility].            Bathing Instructions with Hibiclens    Shower the evening before and morning of your procedure with Chlorhexidine (Hibiclens)  do not use Chlorhexidine on your face or genitals. Do not get in your eyes.  Wash your face with water and your regular face wash/soap  Use your regular shampoo  Apply Chlorhexidine (Hibiclens) directly on your skin or on a wet washcloth and wash gently. When showering: Move away from the shower stream when applying Chlorhexidine (Hibiclens) to avoid rinsing off too soon.  Rinse thoroughly with warm water  Do not dilute Chlorhexidine (Hibiclens)   Dry off as usual, do not use any deodorant, powder, body lotions, perfume, after shave or cologne.

## 2023-06-19 NOTE — ANESTHESIA PREPROCEDURE EVALUATION
06/19/2023  Tyson Perrin is a 47 y.o., male.      Pre-op Assessment    I have reviewed the Patient Summary Reports.       I have reviewed the Medications.     Review of Systems  Anesthesia Hx:  Denies Family Hx of Anesthesia complications.   Denies Personal Hx of Anesthesia complications.   Social:  Marijuana   Hematology/Oncology:  Hematology Normal   Oncology Normal     EENT/Dental:EENT/Dental Normal   Cardiovascular:  Cardiovascular Normal     Pulmonary:  Pulmonary Normal    Renal/:  Renal/ Normal     Hepatic/GI:  Hepatic/GI Normal    Musculoskeletal:  Musculoskeletal Normal    Neurological:  Neurology Normal    Endocrine:  Endocrine Normal    Dermatological:  Skin Normal    Psych:  Psychiatric Normal              Anesthesia Plan  Type of Anesthesia, risks & benefits discussed:    Anesthesia Type: Regional  Intra-op Monitoring Plan: Standard ASA Monitors  Post Op Pain Control Plan: multimodal analgesia  Induction:  IV  Informed Consent: Informed consent signed with the Patient and all parties understand the risks and agree with anesthesia plan.  All questions answered.   ASA Score: 1  Anesthesia Plan Notes: Peripheral nerve block for procedure discussed    Ready For Surgery From Anesthesia Perspective.     .

## 2023-06-20 ENCOUNTER — TELEPHONE (OUTPATIENT)
Dept: ORTHOPEDICS | Facility: CLINIC | Age: 48
End: 2023-06-20
Payer: COMMERCIAL

## 2023-06-20 DIAGNOSIS — S62.308A CLOSED FRACTURE OF 5TH METACARPAL: ICD-10-CM

## 2023-06-20 DIAGNOSIS — Z98.890 POST-OPERATIVE STATE: Primary | ICD-10-CM

## 2023-06-20 RX ORDER — OXYCODONE AND ACETAMINOPHEN 5; 325 MG/1; MG/1
1 TABLET ORAL
Qty: 10 TABLET | Refills: 0 | Status: SHIPPED | OUTPATIENT
Start: 2023-06-20

## 2023-06-20 RX ORDER — IBUPROFEN 600 MG/1
600 TABLET ORAL 3 TIMES DAILY PRN
Qty: 45 TABLET | Refills: 0 | Status: SHIPPED | OUTPATIENT
Start: 2023-06-20

## 2023-06-20 RX ORDER — MUPIROCIN 20 MG/G
OINTMENT TOPICAL
Status: CANCELLED | OUTPATIENT
Start: 2023-06-20

## 2023-06-20 RX ORDER — ACETAMINOPHEN 500 MG
1000 TABLET ORAL 2 TIMES DAILY PRN
Qty: 50 TABLET | Refills: 0 | Status: SHIPPED | OUTPATIENT
Start: 2023-06-20

## 2023-06-20 NOTE — TELEPHONE ENCOUNTER
Spoke c pt. Informed pt of 8:30 arrival time for 06/21/23 surgery at the [facility]. Reminded pt of NPO status. Pt expressed understanding & was thankful.

## 2023-06-21 ENCOUNTER — ANESTHESIA (OUTPATIENT)
Dept: SURGERY | Facility: OTHER | Age: 48
End: 2023-06-21
Payer: COMMERCIAL

## 2023-06-21 ENCOUNTER — HOSPITAL ENCOUNTER (OUTPATIENT)
Facility: OTHER | Age: 48
Discharge: HOME | End: 2023-06-21
Attending: ORTHOPAEDIC SURGERY | Admitting: ORTHOPAEDIC SURGERY
Payer: COMMERCIAL

## 2023-06-21 DIAGNOSIS — S62.308A CLOSED FRACTURE OF 5TH METACARPAL: ICD-10-CM

## 2023-06-21 PROCEDURE — 25000003 PHARM REV CODE 250: Performed by: STUDENT IN AN ORGANIZED HEALTH CARE EDUCATION/TRAINING PROGRAM

## 2023-06-21 PROCEDURE — 26608 TREAT METACARPAL FRACTURE: CPT | Mod: RT,,, | Performed by: ORTHOPAEDIC SURGERY

## 2023-06-21 PROCEDURE — 25000003 PHARM REV CODE 250: Performed by: ANESTHESIOLOGY

## 2023-06-21 PROCEDURE — 63600175 PHARM REV CODE 636 W HCPCS: Performed by: STUDENT IN AN ORGANIZED HEALTH CARE EDUCATION/TRAINING PROGRAM

## 2023-06-21 PROCEDURE — 36000708 HC OR TIME LEV III 1ST 15 MIN: Performed by: ORTHOPAEDIC SURGERY

## 2023-06-21 PROCEDURE — 71000016 HC POSTOP RECOV ADDL HR: Performed by: ORTHOPAEDIC SURGERY

## 2023-06-21 PROCEDURE — 37000008 HC ANESTHESIA 1ST 15 MINUTES: Performed by: ORTHOPAEDIC SURGERY

## 2023-06-21 PROCEDURE — 37000009 HC ANESTHESIA EA ADD 15 MINS: Performed by: ORTHOPAEDIC SURGERY

## 2023-06-21 PROCEDURE — 36000709 HC OR TIME LEV III EA ADD 15 MIN: Performed by: ORTHOPAEDIC SURGERY

## 2023-06-21 PROCEDURE — 26608 PR CLOSED RX METACARPAL FX,PERCUT: ICD-10-PCS | Mod: RT,,, | Performed by: ORTHOPAEDIC SURGERY

## 2023-06-21 PROCEDURE — 63600175 PHARM REV CODE 636 W HCPCS: Performed by: ANESTHESIOLOGY

## 2023-06-21 PROCEDURE — 63600175 PHARM REV CODE 636 W HCPCS

## 2023-06-21 PROCEDURE — 71000015 HC POSTOP RECOV 1ST HR: Performed by: ORTHOPAEDIC SURGERY

## 2023-06-21 PROCEDURE — D9220A PRA ANESTHESIA: ICD-10-PCS | Mod: ,,, | Performed by: STUDENT IN AN ORGANIZED HEALTH CARE EDUCATION/TRAINING PROGRAM

## 2023-06-21 PROCEDURE — 76942 ECHO GUIDE FOR BIOPSY: CPT | Performed by: ANESTHESIOLOGY

## 2023-06-21 PROCEDURE — C1769 GUIDE WIRE: HCPCS | Performed by: ORTHOPAEDIC SURGERY

## 2023-06-21 PROCEDURE — 25000003 PHARM REV CODE 250: Performed by: ORTHOPAEDIC SURGERY

## 2023-06-21 PROCEDURE — D9220A PRA ANESTHESIA: Mod: ,,, | Performed by: STUDENT IN AN ORGANIZED HEALTH CARE EDUCATION/TRAINING PROGRAM

## 2023-06-21 RX ORDER — MIDAZOLAM HYDROCHLORIDE 1 MG/ML
INJECTION, SOLUTION INTRAMUSCULAR; INTRAVENOUS
Status: DISCONTINUED | OUTPATIENT
Start: 2023-06-21 | End: 2023-06-21

## 2023-06-21 RX ORDER — SODIUM CHLORIDE, SODIUM LACTATE, POTASSIUM CHLORIDE, CALCIUM CHLORIDE 600; 310; 30; 20 MG/100ML; MG/100ML; MG/100ML; MG/100ML
INJECTION, SOLUTION INTRAVENOUS CONTINUOUS
Status: DISCONTINUED | OUTPATIENT
Start: 2023-06-21 | End: 2023-06-21 | Stop reason: HOSPADM

## 2023-06-21 RX ORDER — BACITRACIN ZINC 500 UNIT/G
OINTMENT (GRAM) TOPICAL
Status: DISCONTINUED | OUTPATIENT
Start: 2023-06-21 | End: 2023-06-21 | Stop reason: HOSPADM

## 2023-06-21 RX ORDER — ACETAMINOPHEN 325 MG/1
650 TABLET ORAL EVERY 4 HOURS PRN
Status: DISCONTINUED | OUTPATIENT
Start: 2023-06-21 | End: 2023-06-21 | Stop reason: HOSPADM

## 2023-06-21 RX ORDER — PREGABALIN 75 MG/1
75 CAPSULE ORAL ONCE
Status: COMPLETED | OUTPATIENT
Start: 2023-06-21 | End: 2023-06-21

## 2023-06-21 RX ORDER — FENTANYL CITRATE 50 UG/ML
INJECTION, SOLUTION INTRAMUSCULAR; INTRAVENOUS
Status: DISCONTINUED | OUTPATIENT
Start: 2023-06-21 | End: 2023-06-21

## 2023-06-21 RX ORDER — HYDROMORPHONE HYDROCHLORIDE 2 MG/ML
0.4 INJECTION, SOLUTION INTRAMUSCULAR; INTRAVENOUS; SUBCUTANEOUS EVERY 5 MIN PRN
Status: DISCONTINUED | OUTPATIENT
Start: 2023-06-21 | End: 2023-06-21 | Stop reason: HOSPADM

## 2023-06-21 RX ORDER — SODIUM CHLORIDE 0.9 % (FLUSH) 0.9 %
3 SYRINGE (ML) INJECTION
Status: DISCONTINUED | OUTPATIENT
Start: 2023-06-21 | End: 2023-06-21 | Stop reason: HOSPADM

## 2023-06-21 RX ORDER — PROPOFOL 10 MG/ML
VIAL (ML) INTRAVENOUS CONTINUOUS PRN
Status: DISCONTINUED | OUTPATIENT
Start: 2023-06-21 | End: 2023-06-21

## 2023-06-21 RX ORDER — LIDOCAINE HYDROCHLORIDE 10 MG/ML
0.5 INJECTION, SOLUTION EPIDURAL; INFILTRATION; INTRACAUDAL; PERINEURAL ONCE
Status: DISCONTINUED | OUTPATIENT
Start: 2023-06-21 | End: 2023-06-21 | Stop reason: HOSPADM

## 2023-06-21 RX ORDER — PROCHLORPERAZINE EDISYLATE 5 MG/ML
5 INJECTION INTRAMUSCULAR; INTRAVENOUS EVERY 30 MIN PRN
Status: DISCONTINUED | OUTPATIENT
Start: 2023-06-21 | End: 2023-06-21 | Stop reason: HOSPADM

## 2023-06-21 RX ORDER — ACETAMINOPHEN 500 MG
1000 TABLET ORAL
Status: COMPLETED | OUTPATIENT
Start: 2023-06-21 | End: 2023-06-21

## 2023-06-21 RX ORDER — DIPHENHYDRAMINE HYDROCHLORIDE 50 MG/ML
25 INJECTION INTRAMUSCULAR; INTRAVENOUS EVERY 6 HOURS PRN
Status: DISCONTINUED | OUTPATIENT
Start: 2023-06-21 | End: 2023-06-21 | Stop reason: HOSPADM

## 2023-06-21 RX ORDER — ONDANSETRON 2 MG/ML
8 INJECTION INTRAMUSCULAR; INTRAVENOUS EVERY 12 HOURS PRN
Status: DISCONTINUED | OUTPATIENT
Start: 2023-06-21 | End: 2023-06-21 | Stop reason: HOSPADM

## 2023-06-21 RX ORDER — KETAMINE HCL IN 0.9 % NACL 50 MG/5 ML
SYRINGE (ML) INTRAVENOUS
Status: DISCONTINUED | OUTPATIENT
Start: 2023-06-21 | End: 2023-06-21

## 2023-06-21 RX ORDER — PROPOFOL 10 MG/ML
VIAL (ML) INTRAVENOUS
Status: DISCONTINUED | OUTPATIENT
Start: 2023-06-21 | End: 2023-06-21

## 2023-06-21 RX ORDER — MUPIROCIN 20 MG/G
OINTMENT TOPICAL
Status: DISCONTINUED | OUTPATIENT
Start: 2023-06-21 | End: 2023-06-21 | Stop reason: HOSPADM

## 2023-06-21 RX ORDER — ROPIVACAINE HYDROCHLORIDE 5 MG/ML
INJECTION, SOLUTION EPIDURAL; INFILTRATION; PERINEURAL
Status: COMPLETED | OUTPATIENT
Start: 2023-06-21 | End: 2023-06-21

## 2023-06-21 RX ORDER — OXYCODONE HYDROCHLORIDE 5 MG/1
5 TABLET ORAL EVERY 4 HOURS PRN
Status: DISCONTINUED | OUTPATIENT
Start: 2023-06-21 | End: 2023-06-21 | Stop reason: HOSPADM

## 2023-06-21 RX ORDER — MEPERIDINE HYDROCHLORIDE 25 MG/ML
12.5 INJECTION INTRAMUSCULAR; INTRAVENOUS; SUBCUTANEOUS ONCE AS NEEDED
Status: DISCONTINUED | OUTPATIENT
Start: 2023-06-21 | End: 2023-06-21 | Stop reason: HOSPADM

## 2023-06-21 RX ORDER — ONDANSETRON 2 MG/ML
INJECTION INTRAMUSCULAR; INTRAVENOUS
Status: DISCONTINUED | OUTPATIENT
Start: 2023-06-21 | End: 2023-06-21

## 2023-06-21 RX ORDER — OXYCODONE HYDROCHLORIDE 5 MG/1
15 TABLET ORAL EVERY 4 HOURS PRN
Status: DISCONTINUED | OUTPATIENT
Start: 2023-06-21 | End: 2023-06-21 | Stop reason: HOSPADM

## 2023-06-21 RX ORDER — OXYCODONE HYDROCHLORIDE 5 MG/1
5 TABLET ORAL
Status: DISCONTINUED | OUTPATIENT
Start: 2023-06-21 | End: 2023-06-21 | Stop reason: HOSPADM

## 2023-06-21 RX ORDER — DEXAMETHASONE SODIUM PHOSPHATE 4 MG/ML
INJECTION, SOLUTION INTRA-ARTICULAR; INTRALESIONAL; INTRAMUSCULAR; INTRAVENOUS; SOFT TISSUE
Status: DISCONTINUED | OUTPATIENT
Start: 2023-06-21 | End: 2023-06-21

## 2023-06-21 RX ADMIN — PREGABALIN 75 MG: 75 CAPSULE ORAL at 09:06

## 2023-06-21 RX ADMIN — ACETAMINOPHEN 1000 MG: 500 TABLET, FILM COATED ORAL at 09:06

## 2023-06-21 RX ADMIN — FENTANYL CITRATE 50 MCG: 50 INJECTION, SOLUTION INTRAMUSCULAR; INTRAVENOUS at 11:06

## 2023-06-21 RX ADMIN — MIDAZOLAM HYDROCHLORIDE 2 MG: 1 INJECTION, SOLUTION INTRAMUSCULAR; INTRAVENOUS at 10:06

## 2023-06-21 RX ADMIN — ONDANSETRON HYDROCHLORIDE 4 MG: 2 INJECTION INTRAMUSCULAR; INTRAVENOUS at 11:06

## 2023-06-21 RX ADMIN — PROPOFOL 70 MG: 10 INJECTION, EMULSION INTRAVENOUS at 10:06

## 2023-06-21 RX ADMIN — ROPIVACAINE HYDROCHLORIDE 30 ML: 5 INJECTION, SOLUTION EPIDURAL; INFILTRATION; PERINEURAL at 10:06

## 2023-06-21 RX ADMIN — FENTANYL CITRATE 100 MCG: 50 INJECTION, SOLUTION INTRAMUSCULAR; INTRAVENOUS at 10:06

## 2023-06-21 RX ADMIN — PROPOFOL 30 MG: 10 INJECTION, EMULSION INTRAVENOUS at 10:06

## 2023-06-21 RX ADMIN — MUPIROCIN: 20 OINTMENT TOPICAL at 09:06

## 2023-06-21 RX ADMIN — SODIUM CHLORIDE, SODIUM LACTATE, POTASSIUM CHLORIDE, AND CALCIUM CHLORIDE: 600; 310; 30; 20 INJECTION, SOLUTION INTRAVENOUS at 10:06

## 2023-06-21 RX ADMIN — Medication 50 MG: at 10:06

## 2023-06-21 RX ADMIN — DEXAMETHASONE SODIUM PHOSPHATE 8 MG: 4 INJECTION, SOLUTION INTRAMUSCULAR; INTRAVENOUS at 11:06

## 2023-06-21 RX ADMIN — GLYCOPYRROLATE 0.2 MG: 0.2 INJECTION, SOLUTION INTRAMUSCULAR; INTRAVITREAL at 10:06

## 2023-06-21 RX ADMIN — PROPOFOL 150 MCG/KG/MIN: 10 INJECTION, EMULSION INTRAVENOUS at 10:06

## 2023-06-21 RX ADMIN — CEFAZOLIN 2 G: 2 INJECTION, POWDER, FOR SOLUTION INTRAMUSCULAR; INTRAVENOUS at 10:06

## 2023-06-21 NOTE — BRIEF OP NOTE
[facility] - Surgery ([place])  Brief Operative Note    Surgery Date: 6/21/2023     Surgeon(s) and Role:     * Eliza Nieves MD - Primary    Assisting Surgeon: None    Pre-op Diagnosis:  Closed displaced fracture of neck of fifth metacarpal bone of right hand, initial encounter [S62.336A]    Post-op Diagnosis:  Post-Op Diagnosis Codes:     * Closed displaced fracture of neck of fifth metacarpal bone of right hand, initial encounter [S62.336A]    Procedure(s) (LRB):  ORIF, HAND,RIGHT 5TH METACARPAL (Right)    Anesthesia: Regional    Operative Findings: see op note    Estimated Blood Loss: * No values recorded between 6/21/2023 11:04 AM and 6/21/2023 11:40 AM *         Specimens:   Specimen (24h ago, onward)      None              Discharge Note    OUTCOME: Patient tolerated treatment/procedure well without complication and is now ready for discharge.    DISPOSITION: Home or Self Care    FINAL DIAGNOSIS:  Closed fracture of 5th metacarpal    FOLLOWUP: In clinic    DISCHARGE INSTRUCTIONS:    Discharge Procedure Orders   Diet general     Call MD for:  temperature >100.4     Call MD for:  persistent nausea and vomiting     Call MD for:  severe uncontrolled pain     Call MD for:  difficulty breathing, headache or visual disturbances     Call MD for:  redness, tenderness, or signs of infection (pain, swelling, redness, odor or green/yellow discharge around incision site)     Call MD for:  hives     Call MD for:  persistent dizziness or light-headedness     Call MD for:  extreme fatigue

## 2023-06-21 NOTE — PLAN OF CARE
Tyson Perrin has met all discharge criteria from Phase II. Vital Signs are stable, ambulating  without difficulty. Discharge instructions given, patient verbalized understanding. Discharged from facility via wheelchair in stable condition.

## 2023-06-21 NOTE — ANESTHESIA PROCEDURE NOTES
Right costoclavicular    Patient location during procedure: holding area    Reason for block: primary anesthetic    Diagnosis: Right hand   Timeout: 6/21/2023 10:09 AM   End time: 6/21/2023 10:14 AM    Staffing  Authorizing Provider: Manoj Reno MD  Performing Provider: Manoj Reno MD    Staffing  Other anesthesia staff: Manoj Reno MD  Preanesthetic Checklist  Completed: patient identified, IV checked, site marked, risks and benefits discussed, surgical consent, monitors and equipment checked, pre-op evaluation and timeout performed  Peripheral Block  Patient position: sitting  Prep: ChloraPrep  Patient monitoring: heart rate, cardiac monitor, continuous pulse ox and frequent blood pressure checks  Block type: infraclavicular  Laterality: right  Injection technique: single shot  Needle  Needle type: Echogenic   Needle gauge: 20 G  Needle length: 4 in  Needle localization: ultrasound guidance and anatomical landmarks   -ultrasound image captured on disc.  Assessment  Injection assessment: negative aspiration and negative parasthesia  Paresthesia pain: none  Heart rate change: no  Slow fractionated injection: yes  Pain Tolerance: comfortable throughout block and no complaints  Medications:    Medications: ropivacaine (NAROPIN) injection 0.5% - Perineural   30 mL - 6/21/2023 10:14:00 AM    Additional Notes  Costoclavicular approach to infraclavicular block

## 2023-06-21 NOTE — PATIENT INSTRUCTIONS
Do not remove dressing/splint until postop clinic appointment  Keep dressing/splint clean, dry, and intact  Elevate and ice frequently over dressing to decrease swelling  Take medications as prescribed  Call clinic with any questions   Non weight bearing operative hand

## 2023-06-21 NOTE — OP NOTE
Orthopaedic Surgery Operative Report      DATE OF PROCEDURE: 6/21/2023   PREOPERATIVE DIAGNOSIS: Closed displaced fracture of neck of fifth metacarpal bone of right hand, initial encounter [G42.014Z]  POSTOPERATIVE DIAGNOSIS: Closed displaced fracture of neck of fifth metacarpal bone of right hand, initial encounter [O84.456A]  PROCEDURE PERFORMED:   Open reduction with percutaneous pinning right 5th metacarpal  Surgeon(s) and Role:     * Eliza Nieves MD - Primary  ANESTHESIA: Regional  ESTIMATED BLOOD LOSS: 0 cc.   IMPLANTS:  Implant Name Type Inv. Item Serial No.  Lot No. LRB No. Used Action   INnate instrument kit     [id number] Right 1 Implanted        INDICATIONS FOR PROCEDURE: The patient is an 47 y.o. male who suffered a closed 5th metacarpal fracture. Alignment was displaced enough to warrant surgery. It was decided that the best plan of action was to take the patient back to the operative theatre for percutaneous pinning of the 5th metacarpal.  This procedure, as well as, alternatives to this procedure was discussed at length with the patient. Risks and benefits were also discussed. Risks include but are not limited to bleeding, infection, numbness, scarring, damage to major neurovascular structures, limb length/rotation discrepancy, failure of hardware, need for further surgery, loss of function, myocardial infarction, deep venous thrombosis, pulmonary embolism, nonunion, malunion and death. Patient understood these well and consented for the procedure as described.    PROCEDURE IN DETAIL: The patient was identified in the preoperative holding area and site was marked. The patient was wheeled into the Operating Room and Regional anesthesia was induced. The patient was placed into a supine position with the affected limb in the prime position. The affected limb was then prepped and draped in the usual sterile fashion. A timeout was taken to confirm the patient, site, surgery, surgeon and  administration of preoperative antibiotics. All agreed and we proceeded.     Closed reduction of the 5th metacarpal was attempted. Fracture seemed to be wedged within itself and not reducible closed. Dorsal incision was made over the fracture. Extensor tendon was retracted and protected. Open reduction maneuver was performed and adequate reduction achieved. Dorsal comminution identified that would prevent the use of intramedullary metacarpal nail. Decision was made to proceed with k-wire pinning. A k-wire was placed retrograde across the fracture percutaneously. Another k-wire was placed percutaneously through the metacarpal head down the medullary canal. This was advanced through the base of the metacarpal into the carpal bones. Final xrays were taken. Fracture was stable under flouro. K-wires were bent and cut. Incision closed with 3-0 vicryl and 4-0 monocryl with dermabond. Ulnar gutter splint applied.      The patient was extubated, awakened and taken to the post anesthesia care unit in stable condition.     PLAN FOR THE PATIENT:   Return to clinic in 2 weeks for splint removal, xrays, short arm cast. Plan for k-wires to come out around 4-6 weeks postop depending on healing.

## 2023-06-21 NOTE — ANESTHESIA POSTPROCEDURE EVALUATION
Anesthesia Post Evaluation    Patient: Tyson Perrin    Procedure(s) Performed: Procedure(s) (LRB):  ORIF, HAND,RIGHT 5TH METACARPAL (Right)    Final Anesthesia Type: regional      Patient location during evaluation: [facility]  Patient participation: Yes- Able to Participate  Level of consciousness: awake and awake and alert  Post-procedure vital signs: reviewed and stable  Pain management: adequate  Airway patency: patent    PONV status at discharge: No PONV  Anesthetic complications: no      Cardiovascular status: blood pressure returned to baseline and hemodynamically stable  Respiratory status: unassisted and room air  Hydration status: euvolemic  Follow-up not needed.          Vitals Value Taken Time   /96 06/21/23 0910   Temp 36.7 °C (98 °F) 06/21/23 0910   Pulse 77 06/21/23 0910   Resp 18 06/21/23 0910   SpO2 98 % 06/21/23 0910         No case tracking events are documented in the log.      Pain/Marian Score: Pain Rating Prior to Med Admin: 0 (6/21/2023  9:40 AM)

## 2023-06-22 VITALS
HEART RATE: 66 BPM | OXYGEN SATURATION: 100 % | RESPIRATION RATE: 15 BRPM | DIASTOLIC BLOOD PRESSURE: 89 MMHG | TEMPERATURE: 98 F | SYSTOLIC BLOOD PRESSURE: 170 MMHG

## 2023-07-03 ENCOUNTER — TELEPHONE (OUTPATIENT)
Dept: ORTHOPEDICS | Facility: CLINIC | Age: 48
End: 2023-07-03
Payer: COMMERCIAL

## 2023-07-05 ENCOUNTER — OFFICE VISIT (OUTPATIENT)
Dept: ORTHOPEDICS | Facility: CLINIC | Age: 48
End: 2023-07-05
Payer: COMMERCIAL

## 2023-07-05 VITALS
DIASTOLIC BLOOD PRESSURE: 91 MMHG | WEIGHT: 154 LBS | SYSTOLIC BLOOD PRESSURE: 132 MMHG | BODY MASS INDEX: 24.75 KG/M2 | HEIGHT: 66 IN | HEART RATE: 64 BPM

## 2023-07-05 DIAGNOSIS — Z98.890 POST-OPERATIVE STATE: Primary | ICD-10-CM

## 2023-07-05 PROCEDURE — 29075 PR APPLY FOREARM CAST: ICD-10-PCS | Mod: 58,RT,S$GLB, | Performed by: PHYSICIAN ASSISTANT

## 2023-07-05 PROCEDURE — 99999 PR PBB SHADOW E&M-EST. PATIENT-LVL III: CPT | Mod: PBBFAC,,, | Performed by: PHYSICIAN ASSISTANT

## 2023-07-05 PROCEDURE — 3008F PR BODY MASS INDEX (BMI) DOCUMENTED: ICD-10-PCS | Mod: CPTII,S$GLB,, | Performed by: PHYSICIAN ASSISTANT

## 2023-07-05 PROCEDURE — 3075F PR MOST RECENT SYSTOLIC BLOOD PRESS GE 130-139MM HG: ICD-10-PCS | Mod: CPTII,S$GLB,, | Performed by: PHYSICIAN ASSISTANT

## 2023-07-05 PROCEDURE — 1159F PR MEDICATION LIST DOCUMENTED IN MEDICAL RECORD: ICD-10-PCS | Mod: CPTII,S$GLB,, | Performed by: PHYSICIAN ASSISTANT

## 2023-07-05 PROCEDURE — 1159F MED LIST DOCD IN RCRD: CPT | Mod: CPTII,S$GLB,, | Performed by: PHYSICIAN ASSISTANT

## 2023-07-05 PROCEDURE — 3080F PR MOST RECENT DIASTOLIC BLOOD PRESSURE >= 90 MM HG: ICD-10-PCS | Mod: CPTII,S$GLB,, | Performed by: PHYSICIAN ASSISTANT

## 2023-07-05 PROCEDURE — 99024 POSTOP FOLLOW-UP VISIT: CPT | Mod: S$GLB,,, | Performed by: PHYSICIAN ASSISTANT

## 2023-07-05 PROCEDURE — 3080F DIAST BP >= 90 MM HG: CPT | Mod: CPTII,S$GLB,, | Performed by: PHYSICIAN ASSISTANT

## 2023-07-05 PROCEDURE — 99024 PR POST-OP FOLLOW-UP VISIT: ICD-10-PCS | Mod: S$GLB,,, | Performed by: PHYSICIAN ASSISTANT

## 2023-07-05 PROCEDURE — 29075 APPL CST ELBW FNGR SHORT ARM: CPT | Mod: 58,RT,S$GLB, | Performed by: PHYSICIAN ASSISTANT

## 2023-07-05 PROCEDURE — 3008F BODY MASS INDEX DOCD: CPT | Mod: CPTII,S$GLB,, | Performed by: PHYSICIAN ASSISTANT

## 2023-07-05 PROCEDURE — 3075F SYST BP GE 130 - 139MM HG: CPT | Mod: CPTII,S$GLB,, | Performed by: PHYSICIAN ASSISTANT

## 2023-07-05 PROCEDURE — 99999 PR PBB SHADOW E&M-EST. PATIENT-LVL III: ICD-10-PCS | Mod: PBBFAC,,, | Performed by: PHYSICIAN ASSISTANT

## 2023-07-05 NOTE — PROGRESS NOTES
"Mr. Perrin is here today for a post-operative visit.  He is 14 days status post open reduction with percutaneous pinning of the right 5th metacarpal fracture by Dr. Nieves on 6/21/23. He reports that he is doing well. Pain is minimal. He denies fever, chills, and sweats since the time of the surgery.     Physical exam:    Vitals:    07/05/23 1537   BP: (!) 132/91   Pulse: 64   Weight: 69.9 kg (154 lb)   Height: 5' 6" (1.676 m)   PainSc:   2     Vital signs are stable, patient is afebrile.  Patient is well dressed and well groomed, no acute distress.  Alert and oriented to person, place, and time.  Post op dressing taken down.  Incision is clean, dry and intact.  There is no erythema or exudate.  There is no sign of any infection. He is NVI. Sutures removed without difficulty.      Assessment: status post open reduction with percutaneous pinning of the right 5th metacarpal fracture by Dr. Nieves on 6/21/23    Plan:  Tyson was seen today for pain.    Diagnoses and all orders for this visit:    Post-operative state        PO instruction reviewed and provided to patient  Right ulnar gutter fiberglass cast  RTC 2 wks with xray           "

## 2023-07-06 ENCOUNTER — PATIENT MESSAGE (OUTPATIENT)
Dept: REHABILITATION | Facility: HOSPITAL | Age: 48
End: 2023-07-06
Payer: COMMERCIAL

## 2023-07-12 ENCOUNTER — PATIENT MESSAGE (OUTPATIENT)
Dept: ORTHOPEDICS | Facility: CLINIC | Age: 48
End: 2023-07-12
Payer: COMMERCIAL

## 2023-07-19 ENCOUNTER — OFFICE VISIT (OUTPATIENT)
Dept: ORTHOPEDICS | Facility: CLINIC | Age: 48
End: 2023-07-19
Payer: COMMERCIAL

## 2023-07-19 ENCOUNTER — HOSPITAL ENCOUNTER (OUTPATIENT)
Dept: RADIOLOGY | Facility: OTHER | Age: 48
Discharge: HOME | End: 2023-07-19
Attending: PHYSICIAN ASSISTANT
Payer: COMMERCIAL

## 2023-07-19 VITALS
HEART RATE: 82 BPM | SYSTOLIC BLOOD PRESSURE: 139 MMHG | BODY MASS INDEX: 24.75 KG/M2 | DIASTOLIC BLOOD PRESSURE: 98 MMHG | WEIGHT: 154 LBS | HEIGHT: 66 IN

## 2023-07-19 DIAGNOSIS — Z98.890 POST-OPERATIVE STATE: ICD-10-CM

## 2023-07-19 DIAGNOSIS — Z98.890 POST-OPERATIVE STATE: Primary | ICD-10-CM

## 2023-07-19 PROCEDURE — 99999 PR PBB SHADOW E&M-EST. PATIENT-LVL III: CPT | Mod: PBBFAC,,, | Performed by: PHYSICIAN ASSISTANT

## 2023-07-19 PROCEDURE — 99999 PR PBB SHADOW E&M-EST. PATIENT-LVL III: ICD-10-PCS | Mod: PBBFAC,,, | Performed by: PHYSICIAN ASSISTANT

## 2023-07-19 PROCEDURE — 3080F PR MOST RECENT DIASTOLIC BLOOD PRESSURE >= 90 MM HG: ICD-10-PCS | Mod: CPTII,S$GLB,, | Performed by: PHYSICIAN ASSISTANT

## 2023-07-19 PROCEDURE — 99024 POSTOP FOLLOW-UP VISIT: CPT | Mod: S$GLB,,, | Performed by: PHYSICIAN ASSISTANT

## 2023-07-19 PROCEDURE — 99024 PR POST-OP FOLLOW-UP VISIT: ICD-10-PCS | Mod: S$GLB,,, | Performed by: PHYSICIAN ASSISTANT

## 2023-07-19 PROCEDURE — 73130 X-RAY EXAM OF HAND: CPT | Mod: TC,FY,RT

## 2023-07-19 PROCEDURE — 3008F PR BODY MASS INDEX (BMI) DOCUMENTED: ICD-10-PCS | Mod: CPTII,S$GLB,, | Performed by: PHYSICIAN ASSISTANT

## 2023-07-19 PROCEDURE — 3008F BODY MASS INDEX DOCD: CPT | Mod: CPTII,S$GLB,, | Performed by: PHYSICIAN ASSISTANT

## 2023-07-19 PROCEDURE — 73130 XR HAND COMPLETE 3 VIEW RIGHT: ICD-10-PCS | Mod: 26,RT,, | Performed by: RADIOLOGY

## 2023-07-19 PROCEDURE — 3075F PR MOST RECENT SYSTOLIC BLOOD PRESS GE 130-139MM HG: ICD-10-PCS | Mod: CPTII,S$GLB,, | Performed by: PHYSICIAN ASSISTANT

## 2023-07-19 PROCEDURE — 73130 X-RAY EXAM OF HAND: CPT | Mod: 26,RT,, | Performed by: RADIOLOGY

## 2023-07-19 PROCEDURE — 3075F SYST BP GE 130 - 139MM HG: CPT | Mod: CPTII,S$GLB,, | Performed by: PHYSICIAN ASSISTANT

## 2023-07-19 PROCEDURE — 3080F DIAST BP >= 90 MM HG: CPT | Mod: CPTII,S$GLB,, | Performed by: PHYSICIAN ASSISTANT

## 2023-07-19 RX ORDER — HYDROCODONE BITARTRATE AND ACETAMINOPHEN 10; 325 MG/1; MG/1
1 TABLET ORAL EVERY 8 HOURS PRN
COMMUNITY
Start: 2023-05-11

## 2023-07-19 NOTE — PROGRESS NOTES
"Mr. Perrin is here today for a post-operative visit.  He is 4 wks status post open reduction with percutaneous pinning of the right 5th metacarpal fracture by Dr. Nieves on 6/21/23. He reports that he is doing well. Pain is minimal. He denies fever, chills, and sweats since the time of the surgery.     Physical exam:    Vitals:    07/19/23 1533   BP: (!) 139/98   Pulse: 82   Weight: 69.9 kg (154 lb)   Height: 5' 6" (1.676 m)   PainSc: 0-No pain   PainLoc: Hand       Vital signs are stable, patient is afebrile.  Patient is well dressed and well groomed, no acute distress.  Alert and oriented to person, place, and time.  Cast removed. Incision is well healed.  There is no erythema or exudate.  There is no sign of any infection. He is NVI.     Assessment: status post open reduction with percutaneous pinning of the right 5th metacarpal fracture by Dr. Nieves on 6/21/23    Plan:  Tyson was seen today for pain.    Diagnoses and all orders for this visit:    Post-operative state  -     Ambulatory referral/consult to Physical/Occupational Therapy; Future          PO instruction reviewed and provided to patient  Transition to right ulnar gutter TKO brace 15 min fitting educating instructed on use   Therapy ordered   RTC 6 wks with xray             "

## 2023-08-18 ENCOUNTER — HOSPITAL ENCOUNTER (OUTPATIENT)
Dept: RADIOLOGY | Facility: OTHER | Age: 48
Discharge: HOME | End: 2023-08-18
Attending: PHYSICIAN ASSISTANT
Payer: COMMERCIAL

## 2023-08-18 ENCOUNTER — OFFICE VISIT (OUTPATIENT)
Dept: ORTHOPEDICS | Facility: CLINIC | Age: 48
End: 2023-08-18
Payer: COMMERCIAL

## 2023-08-18 VITALS — WEIGHT: 154.13 LBS | BODY MASS INDEX: 24.77 KG/M2 | HEIGHT: 66 IN

## 2023-08-18 DIAGNOSIS — Z98.890 POST-OPERATIVE STATE: Primary | ICD-10-CM

## 2023-08-18 DIAGNOSIS — M20.011 MALLET FINGER OF RIGHT HAND: ICD-10-CM

## 2023-08-18 DIAGNOSIS — Z98.890 POST-OPERATIVE STATE: ICD-10-CM

## 2023-08-18 PROCEDURE — 1159F PR MEDICATION LIST DOCUMENTED IN MEDICAL RECORD: ICD-10-PCS | Mod: CPTII,S$GLB,, | Performed by: PHYSICIAN ASSISTANT

## 2023-08-18 PROCEDURE — 73130 XR HAND COMPLETE 3 VIEW RIGHT: ICD-10-PCS | Mod: 26,RT,, | Performed by: RADIOLOGY

## 2023-08-18 PROCEDURE — 99024 POSTOP FOLLOW-UP VISIT: CPT | Mod: S$GLB,,, | Performed by: PHYSICIAN ASSISTANT

## 2023-08-18 PROCEDURE — 73130 X-RAY EXAM OF HAND: CPT | Mod: 26,RT,, | Performed by: RADIOLOGY

## 2023-08-18 PROCEDURE — 1159F MED LIST DOCD IN RCRD: CPT | Mod: CPTII,S$GLB,, | Performed by: PHYSICIAN ASSISTANT

## 2023-08-18 PROCEDURE — 99999 PR PBB SHADOW E&M-EST. PATIENT-LVL III: CPT | Mod: PBBFAC,,, | Performed by: PHYSICIAN ASSISTANT

## 2023-08-18 PROCEDURE — 3008F PR BODY MASS INDEX (BMI) DOCUMENTED: ICD-10-PCS | Mod: CPTII,S$GLB,, | Performed by: PHYSICIAN ASSISTANT

## 2023-08-18 PROCEDURE — 3008F BODY MASS INDEX DOCD: CPT | Mod: CPTII,S$GLB,, | Performed by: PHYSICIAN ASSISTANT

## 2023-08-18 PROCEDURE — 99024 PR POST-OP FOLLOW-UP VISIT: ICD-10-PCS | Mod: S$GLB,,, | Performed by: PHYSICIAN ASSISTANT

## 2023-08-18 PROCEDURE — 99999 PR PBB SHADOW E&M-EST. PATIENT-LVL III: ICD-10-PCS | Mod: PBBFAC,,, | Performed by: PHYSICIAN ASSISTANT

## 2023-08-18 PROCEDURE — 73130 X-RAY EXAM OF HAND: CPT | Mod: TC,FY,RT
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
## 2023-09-15 ENCOUNTER — TELEPHONE (OUTPATIENT)
Dept: ORTHOPEDICS | Facility: CLINIC | Age: 48
End: 2023-09-15
Payer: COMMERCIAL

## 2023-09-26 ENCOUNTER — CLINICAL SUPPORT (OUTPATIENT)
Dept: REHABILITATION | Facility: HOSPITAL | Age: 48
End: 2023-09-26
Payer: COMMERCIAL

## 2023-09-26 DIAGNOSIS — Z98.890 POST-OPERATIVE STATE: ICD-10-CM

## 2023-09-26 DIAGNOSIS — M25.642 JOINT STIFFNESS OF HAND, LEFT: ICD-10-CM

## 2023-09-26 DIAGNOSIS — M79.642 LEFT HAND PAIN: ICD-10-CM

## 2023-09-26 DIAGNOSIS — M20.011 MALLET FINGER OF RIGHT HAND: ICD-10-CM

## 2023-09-26 DIAGNOSIS — M79.89 SWELLING OF LEFT HAND: ICD-10-CM

## 2023-09-26 PROCEDURE — 97022 WHIRLPOOL THERAPY: CPT

## 2023-09-26 PROCEDURE — 97110 THERAPEUTIC EXERCISES: CPT

## 2023-09-26 PROCEDURE — 97165 OT EVAL LOW COMPLEX 30 MIN: CPT
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
## 2023-09-26 NOTE — PATIENT INSTRUCTIONS
[facility], OCCUPATIONAL THERAPY  HOME EXERCISE PROGRAM               Complete massage 2-3 minutes 4 times a day:        Complete 10 repetitions of each exercise 4-6 times a day:          #1:AROM: DIP Flexion / Extension  Pinch middle knuckle to prevent bending.   Bend end knuckle until stretch is felt.   Hold 3 seconds. Relax. Straighten finger as far as possible.    #2: AROM: PIP Flexion / Extension  Pinch bottom knuckle to prevent bending.   Actively bend middle knuckle until stretch is felt.   Hold 3 seconds. Relax. Straighten finger as far as possible.    Spreads  Lifts    Copyright © I. All rights reserved.     Therapist: Sis Bella, ELIZABETH/L, CHT

## 2023-10-03 ENCOUNTER — CLINICAL SUPPORT (OUTPATIENT)
Dept: REHABILITATION | Facility: HOSPITAL | Age: 48
End: 2023-10-03
Payer: COMMERCIAL

## 2023-10-03 DIAGNOSIS — M20.011 MALLET FINGER OF RIGHT HAND: Primary | ICD-10-CM

## 2023-10-03 PROCEDURE — 97022 WHIRLPOOL THERAPY: CPT | Mod: CO

## 2023-10-03 PROCEDURE — 97530 THERAPEUTIC ACTIVITIES: CPT | Mod: CO

## 2023-10-03 PROCEDURE — 97140 MANUAL THERAPY 1/> REGIONS: CPT | Mod: CO

## 2023-10-03 PROCEDURE — 97110 THERAPEUTIC EXERCISES: CPT | Mod: CO

## 2023-10-03 NOTE — PROGRESS NOTES
[facility]  Occupational Therapy Treatment Note     Date: 10/3/2023  Name: Tyson Perrin  Clinic Number: [id number]    Therapy Diagnosis:   Encounter Diagnosis   Name Primary?    Mallet finger of right hand Yes     Physician: Diane Dexter PA-C    Surgical Procedure and Date: 6/21/2023, Open reduction with percutaneous pinning right 5th metacarpal  Evaluation Date: 9/26/2023  Insurance Authorization Period Expiration: 12/31/2023  Plan of Care Certification Period: 12/19/2023  Date of Return to MD: 9/27/2023  Visit # / Visits authorized: 1 / 20  FOTO: 51% function/9/26/2023     Precautions:  Standard and Weightbearing     Time In:  1:18 pm   Time Out: 2:05 pm  Total Appointment Time (timed & untimed codes): 47 minutes       Subjective     Patient reports: no pain, continues to limit heavy lifting   He was compliant with home exercise program given last session.   Response to previous treatment:good   Functional change: limited 2* sx precautions     Pain: 0/10  Location: right hands      Objective     Objective Measures updated at progress report unless specified.  Observation/Appearance:  Localized edema present and sx scar is slightly hypertrophic.     Edema. Measured in centimeters.    9/26/2023 9/26/2023     Right  Left            MCPs 21.8 21.2            Hand ROM. Measured in degrees.    9/26/2023 9/26/2023     right left   Small:  MP 0/78 0/104               PIP 13/90 0/93               DIP 2/68 0/80              RUTHERFORD                     Sensation: Denies Deficit            Strength (Dyanmometer) and Pinch Strength (Pinch Gauge)  Measured in pounds and psi. Average of three trials.    9/26/2023 9/26/2023     Right  Left    Rung II TBA TBA   Key Pinch       3pt Pinch       2pt Pinch                      CMS Impairment/Limitation/Restriction for FOTO Hnnd Survey     Therapist reviewed FOTO scores for Tyson Perrin on 9/26/2023.   FOTO documents entered into [employer] - see  Media section.     Function Score: 51%              Treatment     Tyson received the treatments listed below:      Therapeutic exercises to develop ROM for 19 minutes, including:  Exercise Reps/Time               AROM:  Wave, Straight Fist, Hook,  Fist, Lifts, Spreads X 10 reps each    reverse blocks  X 10     wrist AROM  X 10 ea way                                 manual therapy techniques: Friction Massage and PROM were applied to the: R SF  for 8 minutes, including:  PROM hooks, fists, extension  Scar massage with vibrating tool       therapeutic activities to improve functional performance for 10  minutes, including:  In hand manip with coins x 2 sets   Pinky scoops various size poms  x 1 set   Isospheres x 2 min   Towel walks x 2 min       supervised modalities after being cleared for contradictions: Fluido x 10 min       Patient Education and Home Exercises     Education provided:   - cont HEP  - Progress towards goals     Written Home Exercises Provided: Patient instructed to cont prior HEP.  Exercises were reviewed and Tyson was able to demonstrate them prior to the end of the session.  Tyson demonstrated good  understanding of the home exercise program provided. See electronic medical record under Patient Instructions for exercises provided during therapy sessions.       Assessment     Pt tolerated session well. ROM improving well, able to close hand into a loose fist with SF. Very mild DIP ext lag noted.      Tyson is progressing well towards his goals and there are no updates to goals at this time. Pt prognosis is Excellent.     Patient will continue to benefit from skilled outpatient occupational therapy to address the deficits listed in the problem list on initial evaluation provide patient/family education and to maximize patient's level of independence in the home and community environment.     Patient's spiritual, cultural and educational needs considered and patient agreeable to plan of care  and goals.    Anticipated barriers to occupational therapy: none     Goals:  Long Term Goals (LTGs); to be met by discharge.  LTG #1: Pt will report a pain level of 2 out of 10 with ADLs/IADLs  ------progressing not met 10/3/2023  LTG #2: Pt will demo improved FOTO score by 35 points. ------progressing not met 10/3/2023  LTG #3: Pt will return to prior level of function for ADLs /IADLs ------progressing not met 10/3/2023     Short Term Goals (STGs); to be met within 6 weeks 11/7/2023  STG #1: Pt will report ------progressing not met 10/3/2023  STG #2: Pt will demo improved FOTO score by 20 points. ------progressing not met 10/3/2023  STG #3: Pt will demonstrate independence with issued HEP. ------progressing not met 10/3/2023  STG #4: Pt will demo full arom of the right small finger.. ------progressing not met 10/3/2023       Plan     Updates/Grading for next session: continue     HEATHER Morris   10/3/2023    Client Care conference completed with evaluating therapist in regards to this patients POC as evidenced by co signature of supervising therapist.

## 2023-10-06 ENCOUNTER — TELEPHONE (OUTPATIENT)
Dept: ORTHOPEDICS | Facility: CLINIC | Age: 48
End: 2023-10-06
Payer: COMMERCIAL

## 2023-10-09 NOTE — PROGRESS NOTES
[facility]  Occupational Therapy Treatment Note     Date: 10/10/2023  Name: Tyson Perrin  Clinic Number: [id number]    Therapy Diagnosis:   Encounter Diagnoses   Name Primary?    Joint stiffness of hand, left Yes    Swelling of left hand     Left hand pain        Physician: Diane Dexter PA-C    Surgical Procedure and Date: 6/21/2023, Open reduction with percutaneous pinning right 5th metacarpal  Evaluation Date: 9/26/2023  Insurance Authorization Period Expiration: 12/31/2023  Plan of Care Certification Period: 12/19/2023  Date of Return to MD: 9/27/2023  Visit # / Visits authorized: 1 / 20  FOTO: 51% function/9/26/2023     Precautions:  Standard and Weightbearing     Time In:  2:10 pm   Time Out: 3:10 pm  Total Appointment Time (timed & untimed codes): 55 minutes       Subjective     Patient reports mild pain at MP of small finger at times.   He was compliant with home exercise program given last session.   Response to previous treatment: nothing reported  Functional change: nothing new reported    Pain: Not rated today./10  Location: right hands      Objective     Objective Measures updated at progress report unless specified.  Observation/Appearance:  Localized edema present and sx scar is slightly hypertrophic.     Edema. Measured in centimeters.    9/26/2023 9/26/2023 10/10/2023     Right  Left  RIght            MCPs 21.8 21.2 21.6            Hand ROM. Measured in degrees.    9/26/2023 9/26/2023 10/10/2023     right left right   Small:  MP 0/78 0/104 0/80               PIP 13/90 0/93 0/90               DIP 2/68 0/80 0/73              RUTHERFORD                       Sensation: Denies Deficit            Strength (Dyanmometer) and Pinch Strength (Pinch Gauge)  Measured in pounds and psi. Average of three trials.    9/26/2023 9/26/2023     Right  Left    Rung II TBA TBA   Key Pinch       3pt Pinch       2pt Pinch                      CMS Impairment/Limitation/Restriction for FOTO  nd Survey     Therapist reviewed FOTO scores for Tyson Perrin on 9/26/2023.   FOTO documents entered into [employer] - see Media section.     Function Score: 51%              Treatment     Tyson received the treatments listed below:    supervised modalities after being cleared for contradictions: Fluido x 10 min to increase soft tissue extensibility and decrease pain,    manual therapy techniques: Friction Massage and PROM were applied to the: R SF  for 15 minutes, including:  PROM MP flex, Straight fist, hooks, full fists     Therapeutic exercises to develop ROM for 30 minutes, including:  Exercise Reps/Time    reassessment  Arom and edema          AAROM:  Wave, Straight Fist, Hook,  Fist X 10+ reps each    reverse blocks  X 10 (NT)    wrist AROM  X 10 ea way (NT)    Medium-Soft Putty for arom and strength  Twirling x 3 min, squeezing x 3 sec x 20 reps pinching x 5 logs  using thumb, ring, and small      education in putty HEP                    therapeutic activities to improve functional performance for NT  minutes, including:  In hand manip with coins x 2 sets   Pinky scoops various size poms  x 1 set   Isospheres x 2 min   Towel walks x 2 min           Patient Education and Home Exercises     Education provided:   - cont HEP  - Progress towards goals     Written Home Exercises Provided: Patient instructed to cont prior HEP.  Exercises were reviewed and Tyson was able to demonstrate them prior to the end of the session.  Tyson demonstrated good  understanding of the home exercise program provided. See electronic medical record under Patient Instructions for exercises provided during therapy sessions.       Assessment     Upon formal reassessment, Tyson shows increase in arom though mild deficits remain. Assess  next session. Added putty exercises today to promote increase in arom, strength, and endurance.     Tyson is progressing well towards his goals and there are no updates to goals at this  time. Pt prognosis is Excellent.     Patient will continue to benefit from skilled outpatient occupational therapy to address the deficits listed in the problem list on initial evaluation provide patient/family education and to maximize patient's level of independence in the home and community environment.     Patient's spiritual, cultural and educational needs considered and patient agreeable to plan of care and goals.    Anticipated barriers to occupational therapy: none     Goals:  Long Term Goals (LTGs); to be met by discharge.  LTG #1: Pt will report a pain level of 2 out of 10 with ADLs/IADLs  ------progressing not met 10/10/2023  LTG #2: Pt will demo improved FOTO score by 35 points. ------progressing not met 10/10/2023  LTG #3: Pt will return to prior level of function for ADLs /IADLs ------progressing not met 10/10/2023     Short Term Goals (STGs); to be met within 6 weeks 11/7/2023  STG #1: Pt will report pain at a level 3 to 4 out of 10 with ADLs/IADLs progressing not met 10/10/2023  STG #2: Pt will demo improved FOTO score by 20 points. ------progressing not met 10/10/2023  STG #3: Pt will demonstrate independence with issued HEP. ----- met 10/10/2023  STG #4: Pt will demo full arom of the right small finger.. ------progressing not met 10/10/2023       Plan     Updates/Grading for next session: assessment  strength and continue to progress as tolerated..    Sis Jose OT   10/10/2023

## 2023-10-10 ENCOUNTER — CLINICAL SUPPORT (OUTPATIENT)
Dept: REHABILITATION | Facility: HOSPITAL | Age: 48
End: 2023-10-10
Payer: COMMERCIAL

## 2023-10-10 ENCOUNTER — TELEPHONE (OUTPATIENT)
Dept: ORTHOPEDICS | Facility: CLINIC | Age: 48
End: 2023-10-10
Payer: COMMERCIAL

## 2023-10-10 DIAGNOSIS — M79.89 SWELLING OF LEFT HAND: ICD-10-CM

## 2023-10-10 DIAGNOSIS — M79.642 LEFT HAND PAIN: ICD-10-CM

## 2023-10-10 DIAGNOSIS — M25.642 JOINT STIFFNESS OF HAND, LEFT: Primary | ICD-10-CM

## 2023-10-10 PROCEDURE — 97140 MANUAL THERAPY 1/> REGIONS: CPT

## 2023-10-10 PROCEDURE — 97110 THERAPEUTIC EXERCISES: CPT

## 2023-10-10 PROCEDURE — 97022 WHIRLPOOL THERAPY: CPT

## 2023-10-10 NOTE — PATIENT INSTRUCTIONS
[facility]  OCCUPATIONAL THERAPY  HOME EXERCISE PROGRAM     Complete the following strengthening program 1x/day.            3 minutes        3 second holds x 20 reps               _       5 logs using thumb and ring, and small fingers                 BELEN Logan, JERARDO  Certified Hand Therapist  Occupational Therapist

## 2023-10-11 ENCOUNTER — HOSPITAL ENCOUNTER (OUTPATIENT)
Dept: RADIOLOGY | Facility: OTHER | Age: 48
Discharge: HOME | End: 2023-10-11
Attending: PHYSICIAN ASSISTANT
Payer: COMMERCIAL

## 2023-10-11 ENCOUNTER — OFFICE VISIT (OUTPATIENT)
Dept: ORTHOPEDICS | Facility: CLINIC | Age: 48
End: 2023-10-11
Payer: COMMERCIAL

## 2023-10-11 VITALS — WEIGHT: 154.13 LBS | BODY MASS INDEX: 24.77 KG/M2 | HEIGHT: 66 IN

## 2023-10-11 DIAGNOSIS — Z98.890 POST-OPERATIVE STATE: Primary | ICD-10-CM

## 2023-10-11 DIAGNOSIS — Z98.890 POST-OPERATIVE STATE: ICD-10-CM

## 2023-10-11 PROBLEM — M79.642 LEFT HAND PAIN: Status: ACTIVE | Noted: 2023-10-11

## 2023-10-11 PROBLEM — M25.642 JOINT STIFFNESS OF HAND, LEFT: Status: ACTIVE | Noted: 2023-10-11

## 2023-10-11 PROBLEM — M79.89 SWELLING OF LEFT HAND: Status: ACTIVE | Noted: 2023-10-11

## 2023-10-11 PROCEDURE — 99213 PR OFFICE/OUTPT VISIT, EST, LEVL III, 20-29 MIN: ICD-10-PCS | Mod: S$GLB,,, | Performed by: PHYSICIAN ASSISTANT

## 2023-10-11 PROCEDURE — 3008F PR BODY MASS INDEX (BMI) DOCUMENTED: ICD-10-PCS | Mod: CPTII,S$GLB,, | Performed by: PHYSICIAN ASSISTANT

## 2023-10-11 PROCEDURE — 99999 PR PBB SHADOW E&M-EST. PATIENT-LVL III: ICD-10-PCS | Mod: PBBFAC,,, | Performed by: PHYSICIAN ASSISTANT

## 2023-10-11 PROCEDURE — 73130 X-RAY EXAM OF HAND: CPT | Mod: 26,RT,, | Performed by: RADIOLOGY

## 2023-10-11 PROCEDURE — 1159F PR MEDICATION LIST DOCUMENTED IN MEDICAL RECORD: ICD-10-PCS | Mod: CPTII,S$GLB,, | Performed by: PHYSICIAN ASSISTANT

## 2023-10-11 PROCEDURE — 73130 XR HAND COMPLETE 3 VIEW RIGHT: ICD-10-PCS | Mod: 26,RT,, | Performed by: RADIOLOGY

## 2023-10-11 PROCEDURE — 73130 X-RAY EXAM OF HAND: CPT | Mod: TC,FY,RT

## 2023-10-11 PROCEDURE — 3008F BODY MASS INDEX DOCD: CPT | Mod: CPTII,S$GLB,, | Performed by: PHYSICIAN ASSISTANT

## 2023-10-11 PROCEDURE — 99999 PR PBB SHADOW E&M-EST. PATIENT-LVL III: CPT | Mod: PBBFAC,,, | Performed by: PHYSICIAN ASSISTANT

## 2023-10-11 PROCEDURE — 1159F MED LIST DOCD IN RCRD: CPT | Mod: CPTII,S$GLB,, | Performed by: PHYSICIAN ASSISTANT

## 2023-10-11 PROCEDURE — 99213 OFFICE O/P EST LOW 20 MIN: CPT | Mod: S$GLB,,, | Performed by: PHYSICIAN ASSISTANT
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
## 2023-10-13 ENCOUNTER — CLINICAL SUPPORT (OUTPATIENT)
Dept: REHABILITATION | Facility: HOSPITAL | Age: 48
End: 2023-10-13
Payer: COMMERCIAL

## 2023-10-13 DIAGNOSIS — M79.642 LEFT HAND PAIN: ICD-10-CM

## 2023-10-13 DIAGNOSIS — M25.642 JOINT STIFFNESS OF HAND, LEFT: Primary | ICD-10-CM

## 2023-10-13 DIAGNOSIS — M79.89 SWELLING OF LEFT HAND: ICD-10-CM

## 2023-10-13 PROCEDURE — 97022 WHIRLPOOL THERAPY: CPT

## 2023-10-13 PROCEDURE — 97140 MANUAL THERAPY 1/> REGIONS: CPT

## 2023-10-13 PROCEDURE — 97530 THERAPEUTIC ACTIVITIES: CPT

## 2023-10-13 PROCEDURE — 97110 THERAPEUTIC EXERCISES: CPT

## 2023-10-13 NOTE — PROGRESS NOTES
"[facility]  Occupational Therapy Treatment Note     Date: 10/13/2023  Name: Tyson Perrin  Clinic Number: [id number]    Therapy Diagnosis:   Encounter Diagnoses   Name Primary?    Joint stiffness of hand, left Yes    Swelling of left hand     Left hand pain        Physician: Diane Dexter PA-C    Surgical Procedure and Date: 6/21/2023, Open reduction with percutaneous pinning right 5th metacarpal  Evaluation Date: 9/26/2023  Insurance Authorization Period Expiration: 12/31/2023  Plan of Care Certification Period: 12/19/2023  Date of Return to MD: 9/27/2023  Visit # / Visits authorized: 3 / 20  FOTO: 51% function/9/26/2023     Precautions:  Standard and Weightbearing     Time In:  8:15 am   Time Out: 9:15 am  Total Appointment Time (timed & untimed codes): 53 minutes       Subjective     Patient reports: that lifting restricitions were removed by Diane Dexter PA-C yesterday and he was informed that the fracture is healed.     He was compliant with home exercise program given last session.   Response to previous treatment:good   Functional change: limited 2* sx precautions     Pain: 0/10 Reports pain at a level 3 -4/10 during PRE's today when "testing my flexibility.  Location: right hands      Objective     Objective Measures updated at progress report unless specified.  Observation/Appearance:  Localized edema present and sx scar is slightly hypertrophic.     Edema. Measured in centimeters.    9/26/2023 9/26/2023 10/10/2023     Right  Left  Right            MCPs 21.8 21.2 21.8            Hand ROM. Measured in degrees.    9/26/2023 9/26/2023 10/10/2023     right left right   Small:  MP 0/78 0/104 0/80               PIP 13/90 0/93 0/90               DIP 2/68 0/80 2/73              [place]                       Sensation: Denies Deficit            Strength (Dyanmometer) and Pinch Strength (Pinch Gauge)  Measured in pounds and psi. Average of three trials.    9/26/2023 9/26/2023     " Right  Left    Rung II TBA TBA   Key Pinch       3pt Pinch       2pt Pinch                      CMS Impairment/Limitation/Restriction for FOTO Hnnd Survey     Therapist reviewed FOTO scores for Tyson Perrin on 9/26/2023.   FOTO documents entered into [employer] - see Media section.     Function Score: 51%              Treatment     Tyson received the treatments listed below:      Supervised Modality: Fluidotherapy: To left hand for 10 min, continuous air, 115 deg, air speed 50 to decrease pain and increase tissue extensibility     manual therapy techniques: Friction Massage and PROM were applied to the: R SF  for 8 minutes, including:  PROM hooks, fists, extension      Therapeutic exercises to develop ROM for 20 minutes, including:  Exercise Reps/Time               A/AAROM:  Wave, Straight Fist, Hook,  Fist, Lifts X 10 reps each   Elbow pre's With 8 lb dumbbell, FA in 3 planes, 3/10 reps ea   Wrist pre's With 5 lb dumbbell, FA in 3 planes, 3/10 reps ea                                  therapeutic activities to improve functional performance for 15  minutes, including:  -Dynamic : 5 min, using calibrated gripper, using black spring in 3 rd hole  -Green Therabar Twisting: pro, sup, wrist extension, wrist flexion, wrist radial deviation, wrist ulnar deviation x 20 reps ea      Patient Education and Home Exercises     Education provided:   - cont HEP  - Progress towards goals     Written Home Exercises Provided: Patient instructed to cont prior HEP.  Exercises were reviewed and Tyson was able to demonstrate them prior to the end of the session.  Tyson demonstrated good  understanding of the home exercise program provided. See electronic medical record under Patient Instructions for exercises provided during therapy sessions.       Assessment     Pt tolerated session well. ROM improving well, able to close hand into a loose fist with SF. Very mild DIP ext lag noted.      Tyson is progressing well towards  his goals and there are no updates to goals at this time. Pt prognosis is Excellent.     Patient will continue to benefit from skilled outpatient occupational therapy to address the deficits listed in the problem list on initial evaluation provide patient/family education and to maximize patient's level of independence in the home and community environment.     Patient's spiritual, cultural and educational needs considered and patient agreeable to plan of care and goals.    Anticipated barriers to occupational therapy: none     Goals:  Long Term Goals (LTGs); to be met by discharge.  LTG #1: Pt will report a pain level of 2 out of 10 with ADLs/IADLs  ------progressing not met 10/13/2023  LTG #2: Pt will demo improved FOTO score by 35 points. ------progressing not met 10/13/2023  LTG #3: Pt will return to prior level of function for ADLs /IADLs ------progressing not met 10/13/2023     Short Term Goals (STGs); to be met within 6 weeks 11/7/2023  STG #1: Pt will report ------progressing not met 10/13/2023  STG #2: Pt will demo improved FOTO score by 20 points. ------progressing not met 10/13/2023  STG #3: Pt will demonstrate independence with issued HEP. ------progressing not met 10/13/2023  STG #4: Pt will demo full arom of the right small finger.. ------progressing not met 10/13/2023       Plan     Updates/Grading for next session: to progress strengthening.     Sis Jose OT   10/13/2023

## 2023-10-16 DIAGNOSIS — Z98.890 POST-OPERATIVE STATE: Primary | ICD-10-CM

## 2023-10-19 ENCOUNTER — CLINICAL SUPPORT (OUTPATIENT)
Dept: REHABILITATION | Facility: HOSPITAL | Age: 48
End: 2023-10-19
Payer: COMMERCIAL

## 2023-10-19 DIAGNOSIS — M79.89 SWELLING OF RIGHT HAND: ICD-10-CM

## 2023-10-19 DIAGNOSIS — M79.641 RIGHT HAND PAIN: ICD-10-CM

## 2023-10-19 DIAGNOSIS — M25.641 JOINT STIFFNESS OF HAND, RIGHT: Primary | ICD-10-CM

## 2023-10-19 PROCEDURE — 97110 THERAPEUTIC EXERCISES: CPT

## 2023-10-19 PROCEDURE — 97022 WHIRLPOOL THERAPY: CPT

## 2023-10-19 PROCEDURE — 97530 THERAPEUTIC ACTIVITIES: CPT

## 2023-10-19 PROCEDURE — 97140 MANUAL THERAPY 1/> REGIONS: CPT

## 2023-10-19 NOTE — PROGRESS NOTES
"[facility]  Occupational Therapy Treatment Note     Date: 10/19/2023  Name: Tyson Perrin  Clinic Number: [id number]    Therapy Diagnosis:   No diagnosis found.      Physician: Diane Dexter PA-C    Surgical Procedure and Date: 6/21/2023, Open reduction with percutaneous pinning right 5th metacarpal  Evaluation Date: 9/26/2023  Insurance Authorization Period Expiration: 12/31/2023  Plan of Care Certification Period: 12/19/2023  Date of Return to MD: 9/27/2023  Visit # / Visits authorized: 3 / 20  FOTO: 51% function/9/26/2023     Precautions:  Standard and Weightbearing     Time In:  8:15 am   Time Out: 9:15 am  Total Appointment Time (timed & untimed codes): 53 minutes       Subjective     Patient reports: that lifting restricitions were removed by Diane Dexter PA-C yesterday and he was informed that the fracture is healed.     He was compliant with home exercise program given last session.   Response to previous treatment:good   Functional change: limited 2* sx precautions     Pain: 0/10 Reports pain at a level 3 -4/10 during PRE's today when "testing my flexibility.  Location: right hands      Objective     Objective Measures updated at progress report unless specified.  Observation/Appearance:  Localized edema present and sx scar is slightly hypertrophic.     Edema. Measured in centimeters.    9/26/2023 9/26/2023 10/10/2023     Right  Left  Right            MCPs 21.8 21.2 21.8            Hand ROM. Measured in degrees.    9/26/2023 9/26/2023 10/10/2023     right left right   Small:  MP 0/78 0/104 0/80               PIP 13/90 0/93 0/90               DIP 2/68 0/80 2/73              [place]                       Sensation: Denies Deficit            Strength (Dyanmometer) and Pinch Strength (Pinch Gauge)  Measured in pounds and psi. Average of three trials.    9/26/2023 9/26/2023     Right  Left    Rung II TBA TBA   Key Pinch       3pt Pinch       2pt Pinch                      CMS " Impairment/Limitation/Restriction for FOTO Hnnd Survey     Therapist reviewed FOTO scores for Tyson Perrin on 9/26/2023.   FOTO documents entered into [employer] - see Media section.     Function Score: 51%              Treatment     Tyson received the treatments listed below:      Supervised Modality: Fluidotherapy: To left hand for 10 min, continuous air, 115 deg, air speed 50 to decrease pain and increase tissue extensibility     manual therapy techniques: Friction Massage and PROM were applied to the: R SF  for 8 minutes, including:  PROM hooks, fists, extension      Therapeutic exercises to develop ROM for 20 minutes, including:  Exercise Reps/Time               A/AAROM:  Wave, Straight Fist, Hook,  Fist, Lifts X 10 reps each   Elbow pre's With 8 lb dumbbell, FA in 3 planes, 3/10 reps ea   Wrist pre's With 5 lb dumbbell, FA in 3 planes, 3/10 reps ea                                  therapeutic activities to improve functional performance for 15  minutes, including:  -Dynamic : 5 min, using calibrated gripper, using black spring in 3 rd hole  -Green Therabar Twisting: pro, sup, wrist extension, wrist flexion, wrist radial deviation, wrist ulnar deviation x 20 reps ea      Patient Education and Home Exercises     Education provided:   - cont HEP  - Progress towards goals     Written Home Exercises Provided: Patient instructed to cont prior HEP.  Exercises were reviewed and Tyson was able to demonstrate them prior to the end of the session.  Tyson demonstrated good  understanding of the home exercise program provided. See electronic medical record under Patient Instructions for exercises provided during therapy sessions.       Assessment     Pt tolerated session well. ROM improving well, able to close hand into a loose fist with SF. Very mild DIP ext lag noted.      Tyson is progressing well towards his goals and there are no updates to goals at this time. Pt prognosis is Excellent.     Patient will  continue to benefit from skilled outpatient occupational therapy to address the deficits listed in the problem list on initial evaluation provide patient/family education and to maximize patient's level of independence in the home and community environment.     Patient's spiritual, cultural and educational needs considered and patient agreeable to plan of care and goals.    Anticipated barriers to occupational therapy: none     Goals:  Long Term Goals (LTGs); to be met by discharge.  LTG #1: Pt will report a pain level of 2 out of 10 with ADLs/IADLs  ------progressing not met 10/19/2023  LTG #2: Pt will demo improved FOTO score by 35 points. ------progressing not met 10/19/2023  LTG #3: Pt will return to prior level of function for ADLs /IADLs ------progressing not met 10/19/2023     Short Term Goals (STGs); to be met within 6 weeks 11/7/2023  STG #1: Pt will report ------progressing not met 10/19/2023  STG #2: Pt will demo improved FOTO score by 20 points. ------progressing not met 10/19/2023  STG #3: Pt will demonstrate independence with issued HEP. ------progressing not met 10/19/2023  STG #4: Pt will demo full arom of the right small finger.. ------progressing not met 10/19/2023       Plan     Updates/Grading for next session: to progress strengthening.     Sis Jose, OT   10/19/2023

## 2023-10-19 NOTE — PROGRESS NOTES
"[facility]  Occupational Therapy Treatment Note     Date: 10/19/2023  Name: Tyson Perrin  Clinic Number: [id number]    Therapy Diagnosis:   Encounter Diagnoses   Name Primary?    Joint stiffness of hand, left Yes    Swelling of left hand     Left hand pain        Physician: Diane Dexter PA-C    Surgical Procedure and Date: 6/21/2023, Open reduction with percutaneous pinning right 5th metacarpal  Evaluation Date: 9/26/2023  Insurance Authorization Period Expiration: 12/31/2023  Plan of Care Certification Period: 12/19/2023  Date of Return to MD: 9/27/2023  Visit # / Visits authorized: 3 / 20  FOTO: 51% function/9/26/2023     Precautions:  Standard and Weightbearing     Time In:  12:10 pm  Time Out: 1:15 pm  Total Appointment Time (timed & untimed codes): 55 minutes       Subjective     Patient reports: that lifting restricitions were removed by Diane Dexter PA-C yesterday and he was informed that the fracture is healed.     He was compliant with home exercise program given last session.   Response to previous treatment:good   Functional change: limited 2* sx precautions     Pain: 0/10 Reports pain at a level 3 -4/10 during PRE's today when "testing my flexibility.  Location: right hands      Objective     Objective Measures updated at progress report unless specified.  Observation/Appearance:  Localized edema present and sx scar is slightly hypertrophic.     Edema. Measured in centimeters.    9/26/2023 9/26/2023 10/10/2023     Right  Left  RIght            MCPs 21.8 21.2 21.8            Hand ROM. Measured in degrees.    9/26/2023 9/26/2023 10/10/2023 10/19/2023     right left right right   Small:  MP 0/78 0/104 0/80 79               PIP 13/90 0/93 0/90 94               DIP 2/68 0/80 2/73 84              [place]                         Sensation: Denies Deficit            Strength (Dyanmometer) and Pinch Strength (Pinch Gauge)  Measured in pounds and psi. Average of three trials.    " 9/26/2023 9/26/2023     Right  Left    Rung  133   Key Pinch       3pt Pinch       2pt Pinch                      CMS Impairment/Limitation/Restriction for FOTO Hnnd Survey     Therapist reviewed FOTO scores for Tyson Perrin on 9/26/2023.   FOTO documents entered into [facility] - see Media section.     Function Score: 51%              Treatment     Tyson received the treatments listed below:      Supervised Modality: Fluidotherapy: To left hand for 10 min, continuous air, 115 deg, air speed 50 to decrease pain and increase tissue extensibility     manual therapy techniques: Friction Massage and PROM were applied to the: R SF  for 10 minutes, including:  PROM hooks,  straight fists, full fist      Therapeutic exercises to develop ROM for 20 minutes, including:  Exercise Reps/Time   Reassessment   AROM     assessment      A/AAROM:  Wave, Straight Fist, Hook,  Fist, Lifts X 10 reps each   Elbow pre's With 10 lb dumbbell, FA in 3 planes, 3/10 reps ea   Wrist pre's With 8 lb dumbbell, FA in 3 planes, 3/10 reps ea                                  therapeutic activities to improve functional performance for 15  minutes, including:  -Dynamic : 5 min, using calibrated gripper, using black spring in 3 rd hole  -Green Therabar Twisting: pro, sup, wrist extension, wrist flexion, wrist radial deviation, wrist ulnar deviation x 20 reps ea      Patient Education and Home Exercises     Education provided:   - cont HEP  - Progress towards goals     Written Home Exercises Provided: Patient instructed to cont prior HEP.  Exercises were reviewed and Tyson was able to demonstrate them prior to the end of the session.  Tyson demonstrated good  understanding of the home exercise program provided. See electronic medical record under Patient Instructions for exercises provided during therapy sessions.       Assessment     Upon formal reassessment, further increases noted in flexion of the SF. Baseline assessment of   reveals weakness as compared to the non-dominant hand.    Tyson is progressing well towards his goals and there are no updates to goals at this time. Pt prognosis is Excellent.     Patient will continue to benefit from skilled outpatient occupational therapy to address the deficits listed in the problem list on initial evaluation provide patient/family education and to maximize patient's level of independence in the home and community environment.     Patient's spiritual, cultural and educational needs considered and patient agreeable to plan of care and goals.    Anticipated barriers to occupational therapy: none     Goals:  Long Term Goals (LTGs); to be met by discharge.  LTG #1: Pt will report a pain level of 2 out of 10 with ADLs/IADLs  ------progressing not met 10/19/2023  LTG #2: Pt will demo improved FOTO score by 35 points. ------progressing not met 10/19/2023  LTG #3: Pt will return to prior level of function for ADLs /IADLs ------progressing not met 10/19/2023     Short Term Goals (STGs); to be met within 6 weeks 11/7/2023  STG #1: Pt will report ------progressing not met 10/19/2023  STG #2: Pt will demo improved FOTO score by 20 points. ------progressing not met 10/19/2023  STG #3: Pt will demonstrate independence with issued HEP. ------met 10/19/2023  STG #4: Pt will demo full arom of the right small finger.. ------progressing not met 10/19/2023       Plan     Updates/Grading for next session:progress strengthening.     Sis Jose, OT   10/19/2023

## 2023-10-23 NOTE — PROGRESS NOTES
[facility]  Occupational Therapy Treatment Note     Date: 10/24/2023  Name: Tyson Perrin  Clinic Number: [id number]    Therapy Diagnosis:   No diagnosis found.      Physician: Diane Dexter PA-C    Surgical Procedure and Date: 6/21/2023, Open reduction with percutaneous pinning right 5th metacarpal  Evaluation Date: 9/26/2023  Insurance Authorization Period Expiration: 12/31/2023  Plan of Care Certification Period: 12/19/2023  Date of Return to MD: 9/27/2023  Visit # / Visits authorized: 3 / 20  FOTO: 51% function/  9/26/2023     Precautions:  Standard and Weightbearing     Time In:  1:10 pm  Time Out: 2:10 pm  Total Appointment Time (timed & untimed codes): 58 minutes       Subjective     Patient reports:that he is having no pain.     He was compliant with home exercise program given last session.   Response to previous treatment:good   Functional change: limited 2* sx precautions     Pain: 0/10     Location: right hands      Objective     Objective Measures updated at progress report unless specified.  Observation/Appearance:  Localized edema present and sx scar is slightly hypertrophic.     Edema. Measured in centimeters.    9/26/2023 9/26/2023 10/10/2023     Right  Left  RIght            MCPs 21.8 21.2 21.8            Hand ROM. Measured in degrees.    9/26/2023 9/26/2023 10/10/2023 10/19/2023     right left right right   Small:  MP 0/78 0/104 0/80 79               PIP 13/90 0/93 0/90 94               DIP 2/68 0/80 2/73 84              [place]                         Sensation: Denies Deficit            Strength (Dyanmometer) and Pinch Strength (Pinch Gauge)  Measured in pounds and psi. Average of three trials.    9/26/2023 9/26/2023     Right  Left    Rung  133   Key Pinch       3pt Pinch       2pt Pinch                      CMS Impairment/Limitation/Restriction for FOTO Hnnd Survey     Therapist reviewed FOTO scores for Tyson Perrin on 9/26/2023.   FOTO documents  entered into [employer] - see Media section.     Function Score: 51%              Treatment     Tyson received the treatments listed below:      Supervised Modality: Fluidotherapy: To left hand for 10 min, continuous air, 115 deg, air speed 50 to decrease pain and increase tissue extensibility     manual therapy techniques: Friction Massage and PROM were applied to the: R SF  for 8 minutes, including:  PROM hooks,  straight fists, full fist      Therapeutic exercises to develop ROM for 15 minutes, including:  Exercise Reps/Time           A/AAROM:  Wave, Straight Fist, Hook,  Fist, Lifts X 10 reps each   Elbow pre's With 10 lb dumbbell, FA in 3 planes, 3/10 reps ea   Wrist pre's With 8 lb dumbbell, FA in 3 planes, 3/10 reps ea                                  therapeutic activities to improve functional performance for 25  minutes, including:  -Dynamic : 5 min, using calibrated gripper, using black spring in 3 rd hole  -Green Therabar Twisting: pro, sup, wrist extension, wrist flexion, wrist radial deviation, wrist ulnar deviation x 20 reps ea  BTE Simulator II with mild/mod resistance                  Tool Plane of motion and time                   504   Wrist flex, wrist ext 45 sec ea   302  Rad dev, ulnar dev 45 sec   162  x 60 sec   162 lat pinch,3-jaw pinch 45 sec ea         Patient Education and Home Exercises     Education provided:   - cont HEP  - Progress towards goals     Written Home Exercises Provided: Patient instructed to cont prior HEP.  Exercises were reviewed and Tyson was able to demonstrate them prior to the end of the session.  Tyson demonstrated good  understanding of the home exercise program provided. See electronic medical record under Patient Instructions for exercises provided during therapy sessions.       Assessment     Added BTE today to further promote increase in strength and endurance. He tolerated this very well.  Tyson is progressing well towards his goals and there are no  updates to goals at this time. Pt prognosis is Excellent.     Patient will continue to benefit from skilled outpatient occupational therapy to address the deficits listed in the problem list on initial evaluation provide patient/family education and to maximize patient's level of independence in the home and community environment.     Patient's spiritual, cultural and educational needs considered and patient agreeable to plan of care and goals.    Anticipated barriers to occupational therapy: none     Goals:  Long Term Goals (LTGs); to be met by discharge.  LTG #1: Pt will report a pain level of 2 out of 10 with ADLs/IADLs  ------progressing not met 10/24/2023  LTG #2: Pt will demo improved FOTO score by 35 points. ------progressing not met 10/24/2023  LTG #3: Pt will return to prior level of function for ADLs /IADLs ------progressing not met 10/24/2023     Short Term Goals (STGs); to be met within 6 weeks 11/7/2023  STG #1: Pt will report ------progressing not met 10/24/2023  STG #2: Pt will demo improved FOTO score by 20 points. ------progressing not met 10/24/2023  STG #3: Pt will demonstrate independence with issued HEP. ------met 10/24/2023  STG #4: Pt will demo full arom of the right small finger.. ------progressing not met 10/24/2023       Plan     Updates/Grading for next session:progress strengthening.     Sis Jose, OT   10/24/2023

## 2023-10-24 ENCOUNTER — CLINICAL SUPPORT (OUTPATIENT)
Dept: REHABILITATION | Facility: HOSPITAL | Age: 48
End: 2023-10-24
Payer: COMMERCIAL

## 2023-10-24 DIAGNOSIS — M79.89 SWELLING OF RIGHT HAND: ICD-10-CM

## 2023-10-24 DIAGNOSIS — M79.641 RIGHT HAND PAIN: ICD-10-CM

## 2023-10-24 DIAGNOSIS — M25.641 JOINT STIFFNESS OF HAND, RIGHT: Primary | ICD-10-CM

## 2023-10-24 PROCEDURE — 97022 WHIRLPOOL THERAPY: CPT

## 2023-10-24 PROCEDURE — 97140 MANUAL THERAPY 1/> REGIONS: CPT

## 2023-10-24 PROCEDURE — 97530 THERAPEUTIC ACTIVITIES: CPT

## 2023-10-24 PROCEDURE — 97110 THERAPEUTIC EXERCISES: CPT

## 2023-10-26 ENCOUNTER — CLINICAL SUPPORT (OUTPATIENT)
Dept: REHABILITATION | Facility: HOSPITAL | Age: 48
End: 2023-10-26
Payer: COMMERCIAL

## 2023-10-26 DIAGNOSIS — M25.641 JOINT STIFFNESS OF HAND, RIGHT: Primary | ICD-10-CM

## 2023-10-26 DIAGNOSIS — Z98.890 POST-OPERATIVE STATE: ICD-10-CM

## 2023-10-26 DIAGNOSIS — M79.89 SWELLING OF RIGHT HAND: ICD-10-CM

## 2023-10-26 DIAGNOSIS — M79.641 RIGHT HAND PAIN: ICD-10-CM

## 2023-10-26 PROCEDURE — 97530 THERAPEUTIC ACTIVITIES: CPT

## 2023-10-26 NOTE — PROGRESS NOTES
[facility]  Occupational Therapy Treatment Note     Date: 10/26/2023  Name: Tyson Perrin  Clinic Number: [id number]    Therapy Diagnosis:   Encounter Diagnoses   Name Primary?    Post-operative state     Joint stiffness of hand, right Yes    Swelling of right hand     Right hand pain          Physician: Diane Dexter PA-C    Surgical Procedure and Date: 6/21/2023, Open reduction with percutaneous pinning right 5th metacarpal  Evaluation Date: 9/26/2023  Insurance Authorization Period Expiration: 12/31/2023  Plan of Care Certification Period: 12/19/2023  Date of Return to MD: 9/27/2023  Visit # / Visits authorized: 3 / 20  FOTO: 51% function/  9/26/2023     Precautions:  Standard and Weightbearing     Time In:  1:15 pm (Pt late)  Time Out: 2:00 pm  Total Appointment Time (timed & untimed codes): 43 minutes       Subjective     Patient reports:that he did experience as increase in pain and soreness after last therapy session.     He was compliant with home exercise program given last session.   Response to previous treatment:good   Functional change: limited 2* sx precautions     Pain: 0/10     Location: right hands      Objective     Objective Measures updated at progress report unless specified.  Observation/Appearance:  Localized edema present and sx scar is slightly hypertrophic.     Edema. Measured in centimeters.    9/26/2023 9/26/2023 10/10/2023     Right  Left  RIght            MCPs 21.8 21.2 21.8            Hand ROM. Measured in degrees.    9/26/2023 9/26/2023 10/10/2023 10/19/2023     right left right right   Small:  MP 0/78 0/104 0/80 79               PIP 13/90 0/93 0/90 94               DIP 2/68 0/80 2/73 84              [place]                         Sensation: Denies Deficit            Strength (Dyanmometer) and Pinch Strength (Pinch Gauge)  Measured in pounds and psi. Average of three trials.    9/26/2023 9/26/2023     Right  Left    Rung  133   Mon Pinch        3pt Pinch       2pt Pinch                      CMS Impairment/Limitation/Restriction for FOTO Hnnd Survey     Therapist reviewed FOTO scores for Tyson Perrin on 9/26/2023.   FOTO documents entered into [employer] - see Media section.     Function Score: 51%              Treatment     Tyson received the treatments listed below:      Supervised Modality: Fluidotherapy: To left hand for 10 min, continuous air, 115 deg, air speed 50 to decrease pain and increase tissue extensibility     manual therapy techniques: Friction Massage and PROM were applied to the: R SF  for 8 minutes, including:  PROM hooks,  straight fists, full fist      Therapeutic exercises to develop ROM for 10 minutes, including:  Exercise Reps/Time           A/AAROM:  Straight Fist, Hook,  Fist,  X 10 reps each   Elbow pre's With 10 lb dumbbell, FA in 3 planes, 3/10 reps ea   Wrist pre's With 8 lb dumbbell, FA in 3 planes, 3/10 reps ea                                  therapeutic activities to improve functional performance for 15  minutes, including:  -Dynamic : 5 min, using calibrated gripper, using black spring in 3 rd hole (NT)  -Green Therabar Twisting: pro, sup, wrist extension, wrist flexion, wrist radial deviation, wrist ulnar deviation x 20 reps ea  BTE Simulator II with mild/mod resistance                  Tool Plane of motion and time                   504   Wrist flex, wrist ext 45 sec ea   302  Rad dev, ulnar dev 45 sec   162  x 60 sec   162 lat pinch,3-jaw pinch 45 sec ea         Patient Education and Home Exercises     Education provided:   - cont HEP  - Progress towards goals     Written Home Exercises Provided: Patient instructed to cont prior HEP.  Exercises were reviewed and Tyson was able to demonstrate them prior to the end of the session.  Tyson demonstrated good  understanding of the home exercise program provided. See electronic medical record under Patient Instructions for exercises provided during therapy  sessions.       Assessment   Tyson reports increase in pain/ sorenss  after last session. This is expected with progression of strengthening.  Tyson is progressing well towards his goals and there are no updates to goals at this time. Pt prognosis is Excellent.     Patient will continue to benefit from skilled outpatient occupational therapy to address the deficits listed in the problem list on initial evaluation provide patient/family education and to maximize patient's level of independence in the home and community environment.     Patient's spiritual, cultural and educational needs considered and patient agreeable to plan of care and goals.    Anticipated barriers to occupational therapy: none     Goals:  Long Term Goals (LTGs); to be met by discharge.  LTG #1: Pt will report a pain level of 2 out of 10 with ADLs/IADLs  ------progressing not met 10/26/2023  LTG #2: Pt will demo improved FOTO score by 35 points. ------progressing not met 10/26/2023  LTG #3: Pt will return to prior level of function for ADLs /IADLs ------progressing not met 10/26/2023     Short Term Goals (STGs); to be met within 6 weeks 11/7/2023  STG #1: Pt will report ------progressing not met 10/26/2023  STG #2: Pt will demo improved FOTO score by 20 points. ------progressing not met 10/26/2023  STG #3: Pt will demonstrate independence with issued HEP. ------met 10/26/2023  STG #4: Pt will demo full arom of the right small finger.. ------progressing not met 10/26/2023       Plan     Updates/Grading for next session:progress strengthening.     Sis Jose, OT   10/26/2023

## (undated) DEVICE — PAD UNDERPAD 30X30

## (undated) DEVICE — SPONGE COTTON TRAY 4X4IN

## (undated) DEVICE — DRESSING N ADH OIL EMUL 3X3

## (undated) DEVICE — PAD CAST SPECIALIST STRL 4

## (undated) DEVICE — SLING ARM LARGE FOAM STRAP

## (undated) DEVICE — SLING ARM LARGE

## (undated) DEVICE — SOL POVIDONE SCRUB IODINE 4 OZ

## (undated) DEVICE — BANDAGE ROLL COTTN 4.5INX4.1YD

## (undated) DEVICE — SPLINT PLASTER FAST SET 5X30IN

## (undated) DEVICE — TOURNIQUET SB QC DP 18X4IN

## (undated) DEVICE — SUT VICRYL 3-0 27 RB-1

## (undated) DEVICE — DRAPE STERI-DRAPE 1000 17X11IN

## (undated) DEVICE — ADHESIVE DERMABOND MINI HV

## (undated) DEVICE — Device

## (undated) DEVICE — BANDAGE MATRIX HK LOOP 4IN 5YD

## (undated) DEVICE — FORCEP STRAIGHT DISP

## (undated) DEVICE — NDL SAFETY 22G X 1.5 ECLIPSE

## (undated) DEVICE — GLOVE BIOGEL ECLIPSE SZ 7

## (undated) DEVICE — PACK UPPER EXTREMITY BAPTIST

## (undated) DEVICE — SOL IRR SOD CHL .9% POUR

## (undated) DEVICE — BAG DRAIN ANTI REFLUX 2000ML

## (undated) DEVICE — APPLICATOR CHLORAPREP ORN 26ML

## (undated) DEVICE — DRAPE C-ARM MINI DISP

## (undated) DEVICE — GLOVE BIOGEL PI MICRO INDIC 7

## (undated) DEVICE — SUT MONOCRYL 3-0 PS-2 UND

## (undated) DEVICE — CORD BIPOLAR 12 FOOT

## (undated) DEVICE — BUCKET PLASTER DISPOSABLE